# Patient Record
Sex: MALE | Race: OTHER | HISPANIC OR LATINO | URBAN - METROPOLITAN AREA
[De-identification: names, ages, dates, MRNs, and addresses within clinical notes are randomized per-mention and may not be internally consistent; named-entity substitution may affect disease eponyms.]

---

## 2018-06-13 ENCOUNTER — EMERGENCY (EMERGENCY)
Facility: HOSPITAL | Age: 48
LOS: 1 days | Discharge: HOME | End: 2018-06-13
Attending: EMERGENCY MEDICINE
Payer: MEDICARE

## 2018-06-13 VITALS
WEIGHT: 156.97 LBS | OXYGEN SATURATION: 97 % | DIASTOLIC BLOOD PRESSURE: 71 MMHG | RESPIRATION RATE: 18 BRPM | HEART RATE: 87 BPM | TEMPERATURE: 99 F | SYSTOLIC BLOOD PRESSURE: 135 MMHG | HEIGHT: 66 IN

## 2018-06-13 VITALS
OXYGEN SATURATION: 100 % | SYSTOLIC BLOOD PRESSURE: 128 MMHG | HEART RATE: 80 BPM | DIASTOLIC BLOOD PRESSURE: 68 MMHG | RESPIRATION RATE: 18 BRPM | TEMPERATURE: 98 F

## 2018-06-13 PROCEDURE — 99282 EMERGENCY DEPT VISIT SF MDM: CPT

## 2018-06-13 NOTE — ED PROVIDER NOTE - MEDICAL DECISION MAKING DETAILS
Reports dystonic reaction c/w his usual chronic dystonia reactions and states these symptoms have resolved. Noted Parkinsonian mvmts which pt states is his current baseline. Well appearing, hemodynamically stable. Discharged with need for PMD f/u and return precautions.

## 2018-06-13 NOTE — ED PROVIDER NOTE - PHYSICAL EXAMINATION
Afebrile, hemodynamically stable, saturating well  NAD, well appearing  Head NCAT  EOMI grossly, anicteric  MMM  No JVD  RRR, nml S1/S2, no m/r/g  Lungs CTAB, no w/r/r  Abd soft, NT, ND, nml BS, no rebound or guarding  AAO, CN's 3-12 grossly intact  JUNE spontaneously, no leg cyanosis or edema  Skin warm, well perfused, no rashes or hives Afebrile, hemodynamically stable, saturating well  NAD, well appearing  Head NCAT  EOMI grossly, anicteric  MM dry  RRR, nml S1/S2, no m/r/g  Lungs CTAB, no w/r/r  Abd soft, NT, ND, nml BS, no rebound or guarding  AAO, CN's 3-12 grossly intact  JUNE spontaneously, no leg cyanosis or edema  Skin warm, dry, no rashes or hives

## 2018-06-13 NOTE — ED ADULT TRIAGE NOTE - CHIEF COMPLAINT QUOTE
rachelle w/ spouse sent from clinic for pain all over . wife reports pt w/ h/o Dystonia but having " an attack " increased  muscular pains for 3 days

## 2019-02-20 ENCOUNTER — INPATIENT (INPATIENT)
Facility: HOSPITAL | Age: 49
LOS: 5 days | Discharge: NURSING, REHAB OR LONG-TERM CARE | DRG: 56 | End: 2019-02-26
Attending: INTERNAL MEDICINE | Admitting: INTERNAL MEDICINE
Payer: MEDICARE

## 2019-02-20 VITALS
OXYGEN SATURATION: 97 % | TEMPERATURE: 98 F | HEART RATE: 92 BPM | RESPIRATION RATE: 18 BRPM | WEIGHT: 160.06 LBS | SYSTOLIC BLOOD PRESSURE: 160 MMHG | DIASTOLIC BLOOD PRESSURE: 101 MMHG

## 2019-02-20 DIAGNOSIS — Z98.890 OTHER SPECIFIED POSTPROCEDURAL STATES: Chronic | ICD-10-CM

## 2019-02-20 DIAGNOSIS — Z90.49 ACQUIRED ABSENCE OF OTHER SPECIFIED PARTS OF DIGESTIVE TRACT: Chronic | ICD-10-CM

## 2019-02-20 DIAGNOSIS — R26.2 DIFFICULTY IN WALKING, NOT ELSEWHERE CLASSIFIED: ICD-10-CM

## 2019-02-20 LAB
ALBUMIN SERPL ELPH-MCNC: 4.3 G/DL — SIGNIFICANT CHANGE UP (ref 3.3–5)
ALP SERPL-CCNC: 73 U/L — SIGNIFICANT CHANGE UP (ref 40–120)
ALT FLD-CCNC: 16 U/L — SIGNIFICANT CHANGE UP (ref 10–45)
ANION GAP SERPL CALC-SCNC: 14 MMOL/L — SIGNIFICANT CHANGE UP (ref 5–17)
AST SERPL-CCNC: 24 U/L — SIGNIFICANT CHANGE UP (ref 10–40)
BASOPHILS # BLD AUTO: 0 K/UL — SIGNIFICANT CHANGE UP (ref 0–0.2)
BASOPHILS NFR BLD AUTO: 0.4 % — SIGNIFICANT CHANGE UP (ref 0–2)
BILIRUB SERPL-MCNC: 2 MG/DL — HIGH (ref 0.2–1.2)
BUN SERPL-MCNC: 11 MG/DL — SIGNIFICANT CHANGE UP (ref 7–23)
CALCIUM SERPL-MCNC: 9.4 MG/DL — SIGNIFICANT CHANGE UP (ref 8.4–10.5)
CHLORIDE SERPL-SCNC: 98 MMOL/L — SIGNIFICANT CHANGE UP (ref 96–108)
CO2 SERPL-SCNC: 25 MMOL/L — SIGNIFICANT CHANGE UP (ref 22–31)
CREAT SERPL-MCNC: 0.74 MG/DL — SIGNIFICANT CHANGE UP (ref 0.5–1.3)
D DIMER BLD IA.RAPID-MCNC: 630 NG/ML DDU — HIGH
EOSINOPHIL # BLD AUTO: 0.1 K/UL — SIGNIFICANT CHANGE UP (ref 0–0.5)
EOSINOPHIL NFR BLD AUTO: 1.2 % — SIGNIFICANT CHANGE UP (ref 0–6)
GAS PNL BLDV: SIGNIFICANT CHANGE UP
GLUCOSE SERPL-MCNC: 105 MG/DL — HIGH (ref 70–99)
HCT VFR BLD CALC: 45.6 % — SIGNIFICANT CHANGE UP (ref 39–50)
HGB BLD-MCNC: 15.2 G/DL — SIGNIFICANT CHANGE UP (ref 13–17)
LYMPHOCYTES # BLD AUTO: 1.4 K/UL — SIGNIFICANT CHANGE UP (ref 1–3.3)
LYMPHOCYTES # BLD AUTO: 17.7 % — SIGNIFICANT CHANGE UP (ref 13–44)
MAGNESIUM SERPL-MCNC: 2 MG/DL — SIGNIFICANT CHANGE UP (ref 1.6–2.6)
MCHC RBC-ENTMCNC: 30.6 PG — SIGNIFICANT CHANGE UP (ref 27–34)
MCHC RBC-ENTMCNC: 33.4 GM/DL — SIGNIFICANT CHANGE UP (ref 32–36)
MCV RBC AUTO: 91.7 FL — SIGNIFICANT CHANGE UP (ref 80–100)
MONOCYTES # BLD AUTO: 0.6 K/UL — SIGNIFICANT CHANGE UP (ref 0–0.9)
MONOCYTES NFR BLD AUTO: 8 % — SIGNIFICANT CHANGE UP (ref 2–14)
NEUTROPHILS # BLD AUTO: 5.6 K/UL — SIGNIFICANT CHANGE UP (ref 1.8–7.4)
NEUTROPHILS NFR BLD AUTO: 72.7 % — SIGNIFICANT CHANGE UP (ref 43–77)
NT-PROBNP SERPL-SCNC: 7 PG/ML — SIGNIFICANT CHANGE UP (ref 0–300)
PLATELET # BLD AUTO: 230 K/UL — SIGNIFICANT CHANGE UP (ref 150–400)
POTASSIUM SERPL-MCNC: 3.8 MMOL/L — SIGNIFICANT CHANGE UP (ref 3.5–5.3)
POTASSIUM SERPL-SCNC: 3.8 MMOL/L — SIGNIFICANT CHANGE UP (ref 3.5–5.3)
PROT SERPL-MCNC: 7.2 G/DL — SIGNIFICANT CHANGE UP (ref 6–8.3)
RBC # BLD: 4.97 M/UL — SIGNIFICANT CHANGE UP (ref 4.2–5.8)
RBC # FLD: 12.1 % — SIGNIFICANT CHANGE UP (ref 10.3–14.5)
SODIUM SERPL-SCNC: 137 MMOL/L — SIGNIFICANT CHANGE UP (ref 135–145)
TROPONIN T, HIGH SENSITIVITY RESULT: 8 NG/L — SIGNIFICANT CHANGE UP (ref 0–51)
WBC # BLD: 7.8 K/UL — SIGNIFICANT CHANGE UP (ref 3.8–10.5)
WBC # FLD AUTO: 7.8 K/UL — SIGNIFICANT CHANGE UP (ref 3.8–10.5)

## 2019-02-20 PROCEDURE — 71275 CT ANGIOGRAPHY CHEST: CPT | Mod: 26

## 2019-02-20 PROCEDURE — 73030 X-RAY EXAM OF SHOULDER: CPT | Mod: 26,50

## 2019-02-20 PROCEDURE — 71045 X-RAY EXAM CHEST 1 VIEW: CPT | Mod: 26

## 2019-02-20 PROCEDURE — 99285 EMERGENCY DEPT VISIT HI MDM: CPT | Mod: GC

## 2019-02-20 RX ORDER — CARBIDOPA AND LEVODOPA 25; 100 MG/1; MG/1
1 TABLET ORAL ONCE
Qty: 0 | Refills: 0 | Status: COMPLETED | OUTPATIENT
Start: 2019-02-20 | End: 2019-02-20

## 2019-02-20 RX ORDER — ACETAMINOPHEN 500 MG
650 TABLET ORAL ONCE
Qty: 0 | Refills: 0 | Status: DISCONTINUED | OUTPATIENT
Start: 2019-02-20 | End: 2019-02-20

## 2019-02-20 RX ORDER — ACETAMINOPHEN 500 MG
975 TABLET ORAL ONCE
Qty: 0 | Refills: 0 | Status: COMPLETED | OUTPATIENT
Start: 2019-02-20 | End: 2019-02-20

## 2019-02-20 RX ADMIN — Medication 975 MILLIGRAM(S): at 17:45

## 2019-02-20 RX ADMIN — CARBIDOPA AND LEVODOPA 1 TABLET(S): 25; 100 TABLET ORAL at 21:47

## 2019-02-20 NOTE — H&P ADULT - HISTORY OF PRESENT ILLNESS
48 year old male w/ past medical hx of Parkinson's disease and dystonia who was brought in by EMS secondary to shoulder pain. Patient initially stated he had b/l shoulder pain that started at 1PM, but then requested  services.  ID # [id number] was used. When a  was used, patient reported that he has been having mostly L shoulder pain for the last 1 month. He stated he fell down in his house side-ways secondary to gait imbalance. He did not his head, or lose consciousness during that fall. He reports 3 falls in the last year, that he attributes to gait imbalance secondary to Parkinson's disease. He requested muscle relaxers at one point. He describes his shoulder pain as dull and stabbing pain that the patient localizes to L anterior shoulder that is constant (8.5/10 in severity). He reports nothing makes the pain better or worse. He reports he tried a patch for his shoulder pain, but that did not help.    pt. is having frequent hospitalization , 4 times in last 2 months , last admission was for psych admission

## 2019-02-20 NOTE — ED PROVIDER NOTE - PROGRESS NOTE DETAILS
If all workup negative, patient may be discharged w/ neurology follow-up w/in 3-5 days. If all workup negative, patient may be discharged w/ neurology follow-up w/in 3-5 days. Dr. Nino Cavazos was called on [phone], and message left with answering service that patient's wife is requesting his medications need to adjusted. merary: pts wife came to the ER notes that cannot take care of patient at home would like him to get either more aid hours or be placed at a facility (as per conversation with resident)--will admit

## 2019-02-20 NOTE — ED PROVIDER NOTE - CLINICAL SUMMARY MEDICAL DECISION MAKING FREE TEXT BOX
48 year old male w/ parkinson's disease who was brought in by EMS for ?L shoulder pain vs CP vs SOB vs appropriate management of his Parkinsonian disorder. [Different issues/complaints are presented every time patient is questioned]. Will work up CP/SOB for ACS with trop, EKG, labs. Will also get CXR. Will consult neurology given patient and wife's concerns. 48 year old male w/ parkinson's disease who was brought in by EMS for ?L shoulder pain vs CP vs SOB vs appropriate management of his Parkinsonian disorder. [Different issues/complaints are presented every time patient is questioned]. Will work up CP/SOB for ACS with trop, EKG, labs. Will also get CXR. Will get d-dimer given immobility.

## 2019-02-20 NOTE — ED ADULT NURSE NOTE - OBJECTIVE STATEMENT
48y m pt arrived via ems; emt reports visiting nurse called ems because pt c/o shoulder pain s/p fall x 1 mo ago; nurse states pt out of pain meds; pt has hx of parkinsons and dystonia and is very rigid; ambulates with a walker; through  [facility]; #[id number] pt states has bilat shoulder pain for a month s/p multiple falls due to unsteady gait secondary to parkinsons disease; pt state left shoulder worse than right; describes pain as sharp and 48y m pt arrived via ems; emt reports visiting nurse called ems because pt c/o shoulder pain s/p fall x 1 mo ago; nurse states pt out of pain meds; pt has hx of parkinsons and dystonia and is very rigid; ambulates with a walker; through  [facility]; #[id number] pt states has bilat shoulder pain for a month s/p multiple falls due to unsteady gait secondary to parkinsons disease; pt state left shoulder worse than right; describes pain as sharp and continuous; 48y m pt arrived via ems; emt reports visiting nurse called ems because pt c/o shoulder pain s/p fall x 1 mo ago; nurse states pt out of pain meds; pt has hx of parkinsons and dystonia and is very rigid; ambulates with a walker; through  [facility]; #[id number] pt states has bilat shoulder pain for a month s/p multiple falls due to unsteady gait secondary to parkinsons disease; pt state left shoulder worse than right; describes pain as sharp and continuous; pt aox3; flat affect; no resp distress; no chest pain; no abd pain; no n/v/d; pt very rigid and has shakes; pt states ambulates only with a walker; pt states lives with wife; labs drawn pt medicated per md order; safety/comfort maintained

## 2019-02-20 NOTE — H&P ADULT - PROBLEM SELECTOR PLAN 2
will consult neurology: dr kilpatrick   -as per spouse his parkinson's symptoms are very poorly controlled

## 2019-02-20 NOTE — ED ADULT NURSE REASSESSMENT NOTE - NS ED NURSE REASSESS COMMENT FT1
Received report from ED RN Lillie; patient awake and alert- VSS, 20 g IV in R hand placed and labs drawn. patient pending xray and lab results. Spouse at the bedside.

## 2019-02-20 NOTE — H&P ADULT - NSHPLABSRESULTS_GEN_ALL_CORE
15.2   7.8   )-----------( 230      ( 20 Feb 2019 17:11 )             45.6   02-20    137  |  98  |  11  ----------------------------<  105<H>  3.8   |  25  |  0.74    Ca    9.4      20 Feb 2019 17:11  Mg     2.0     02-20    TPro  7.2  /  Alb  4.3  /  TBili  2.0<H>  /  DBili  x   /  AST  24  /  ALT  16  /  AlkPhos  73  02-20

## 2019-02-20 NOTE — ED PROVIDER NOTE - NS ED ROS FT
General:  No fever or chills.  + Fatigue (past 1 month)  HEENT: No headache. No acute vision changes. No congestion or sore throat.   Cardiovascular: No chest pain. No dyspnea on exertion.  Respiratory: +Dyspnea (acute, for the last 1 month). No orthopnea.   GI: No nausea/vomiting. No diarrhea. No abdominal pain. + constipation.   : No dysuria.   Neuro: No syncope. Hx of Parkinson's disease  Musculoskeletal: Myalgias (that patient attributes to Parkinson's disease. +Shoulder pain.

## 2019-02-20 NOTE — H&P ADULT - NSHPPHYSICALEXAM_GEN_ALL_CORE
pt. seen and examined, confused , NAD     Vital Signs Last 24 Hrs  T(C): 36.6 (20 Feb 2019 23:28), Max: 36.9 (20 Feb 2019 15:23)  T(F): 97.9 (20 Feb 2019 23:28), Max: 98.5 (20 Feb 2019 19:40)  HR: 86 (20 Feb 2019 23:28) (82 - 92)  BP: 135/84 (20 Feb 2019 23:28) (125/80 - 160/101)  BP(mean): --  RR: 18 (20 Feb 2019 23:28) (16 - 18)  SpO2: 98% (20 Feb 2019 23:28) (96% - 98%)    heent: nc/at  neck: supple, no JVD  Lungs: b/L fair A/E, no w/r/r  heart: s1s2 nml  abd: soft, NABS, NT/Nd  ext: no e/c/c, left shoulder pain , decrease ROM   neuro: aaox2, moving all ext , tremors +

## 2019-02-20 NOTE — ED PROVIDER NOTE - OBJECTIVE STATEMENT
48 year old male w/ past medical hx of Parkinson's disease and dystonia who was brought in by EMS secondary to shoulder pain. Patient initially stated he had b/l shoulder pain that started at 1PM, but then requested  services.  ID # [id number] was used. When a  was used, patient reported that he has been having mostly L shoulder pain for the last 1 month. He stated he fell down in his house side-ways secondary to gait imbalance. He did not his head, or lose consciousness during that fall. He reports 3 falls in the last year, that he attributes to gait imbalance secondary to Parkinson's disease. He requested muscle relaxers at one point. He describes his shoulder pain as dull and stabbing pain that the patient localizes to L anterior shoulder that is constant (8.5/10 in severity). He reports nothing makes the pain better or worse. He reports he tried a patch for his shoulder pain, but that did not help.     Per EMS, the visiting RN told them that the patient is out of his pain meds. 48 year old male w/ past medical hx of Parkinson's disease and dystonia who was brought in by EMS secondary to shoulder pain. Patient initially stated he had b/l shoulder pain that started at 1PM, but then requested  services.  ID # [id number] was used. When a  was used, patient reported that he has been having mostly L shoulder pain for the last 1 month. He stated he fell down in his house side-ways secondary to gait imbalance. He did not his head, or lose consciousness during that fall. He reports 3 falls in the last year, that he attributes to gait imbalance secondary to Parkinson's disease. He requested muscle relaxers at one point. He describes his shoulder pain as dull and stabbing pain that the patient localizes to L anterior shoulder that is constant (8.5/10 in severity). He reports nothing makes the pain better or worse. He reports he tried a patch for his shoulder pain, but that did not help.     Per EMS, the visiting RN told them that the patient is out of his pain meds.    Patient was later joined by his wife.  # [id number] was used to interview wife. Per wife, patient 48 year old male w/ past medical hx of Parkinson's disease and dystonia who was brought in by EMS secondary to shoulder pain. Patient initially stated he had b/l shoulder pain that started at 1PM, but then requested  services.  ID # [id number] was used. When a  was used, patient reported that he has been having mostly L shoulder pain for the last 1 month. He stated he fell down in his house side-ways secondary to gait imbalance. He did not his head, or lose consciousness during that fall. He reports 3 falls in the last year, that he attributes to gait imbalance secondary to Parkinson's disease. He requested muscle relaxers at one point. He describes his shoulder pain as dull and stabbing pain that the patient localizes to L anterior shoulder that is constant (8.5/10 in severity). He reports nothing makes the pain better or worse. He reports he tried a patch for his shoulder pain, but that did not help.     Per EMS, the visiting RN told them that the patient is out of his pain meds.    Patient was later joined by his wife.  # [id number] was used to interview wife. Per wife, patient has a home aide for 7 hrs/day. She stated she works, and that it was going to be a couple of hours that patient would be alone, and hence her home aide and she decided to send him to the ED (so that he can be watched till she's back). She, however, is concerned that his PD is not being appropriately managed. She stated he had been hospitalized four times in the last 2 months - most of them secondary to his neurological disorder, except the last one was a psychiatric hospitalization. She reports he was admitted Saturday, and was discharged yesterday. She does not believe he has a psychiatric issue, she believes the paranoia is secondary to incorrect management of his Parkinson's disease. 48 year old male w/ past medical hx of Parkinson's disease and dystonia who was brought in by EMS secondary to shoulder pain. Patient initially stated he had b/l shoulder pain that started at 1PM, but then requested  services.  ID # [id number] was used. When a  was used, patient reported that he has been having mostly L shoulder pain for the last 1 month. He stated he fell down in his house side-ways secondary to gait imbalance. He did not his head, or lose consciousness during that fall. He reports 3 falls in the last year, that he attributes to gait imbalance secondary to Parkinson's disease. He requested muscle relaxers at one point. He describes his shoulder pain as dull and stabbing pain that the patient localizes to L anterior shoulder that is constant (8.5/10 in severity). He reports nothing makes the pain better or worse. He reports he tried a patch for his shoulder pain, but that did not help.     Per EMS, the visiting RN told them that the patient is out of his pain meds.    Patient was later joined by his wife.  # [id number] was used to interview wife. Per wife, patient has a home aide for 7 hrs/day. She stated she works, and that it was going to be a couple of hours that patient would be alone, and hence her home aide and she decided to send him to the ED (so that he can be watched till she's back). She, however, is concerned that his PD is not being appropriately managed. She stated he had been hospitalized four times in the last 2 months - most of them secondary to his neurological disorder, except the last one was a psychiatric hospitalization. She reports he was admitted Saturday, and was discharged yesterday. She does not believe he has a psychiatric issue, she believes the paranoia is secondary to incorrect management of his Parkinson's disease. She reports Trihexyphenidyl was recently discontinued secondary to paranoia. Wife reports patient's neurologist recently upped his carbidopa-levadopa to "4 pills every 4 hours". They then visited a second neurologist - ?Dr. Nino Schroeder who stated that the carbidopa-levadopa dose was too high, and that patient should be seen by a neurosurgeon (?for DBS). Patient's wife believes patient's neurological condition is being mismanaged and that patient needs to be seen by a neurologist for proper management.    When asked regarding patient's shoulder pain, wife states that it is chronic and that his lidocaine patch was recently discontinued. He was instead placed on Tylenol and that it is not helping him. Wife denied patient had chest pain, but states patient has been having shortness of breath. 48 year old male w/ past medical hx of Parkinson's disease and dystonia who was brought in by EMS secondary to shoulder pain. Patient initially stated he had b/l shoulder pain that started at 1PM, but then requested  services.  ID # [id number] was used. When a  was used, patient reported that he has been having mostly L shoulder pain for the last 1 month. He stated he fell down in his house side-ways secondary to gait imbalance. He did not his head, or lose consciousness during that fall. He reports 3 falls in the last year, that he attributes to gait imbalance secondary to Parkinson's disease. He requested muscle relaxers at one point. He describes his shoulder pain as dull and stabbing pain that the patient localizes to L anterior shoulder that is constant (8.5/10 in severity). He reports nothing makes the pain better or worse. He reports he tried a patch for his shoulder pain, but that did not help.     Per EMS, the visiting RN told them that the patient is out of his pain meds.    Patient's pharmacy [facility] was called at [phone] and they provided the following list of meds:  Carbidopa-Levadopa ER  (4 tabs QID)  Methocarbamol 750 BID  Sinemet  1 tab po TID prn  Neurpro 6mg 24 hour patch.    Patient's neurologist Dr. Mckeon was called and left a message at [phone]. Awaiting call back.     Patient was later joined by his wife.     Patient's wife provided patient's medications as:  Neupro 4mg 24 hr patch  Senna 2 tabs po qHS  Amantadine 100 mg po BID  Methocarbamol 500 mg po TID  Carbidopa-Levodopa  1 tab po TID  Tylenol 325 2tabs po q6H prn      # [id number] was used to interview wife. Per wife, patient has a home aide for 7 hrs/day. She stated she works, and that it was going to be a couple of hours that patient would be alone, and hence her home aide and she decided to send him to the ED (so that he can be watched till she's back). She, however, is concerned that his PD is not being appropriately managed. She stated he had been hospitalized four times in the last 2 months - most of them secondary to his neurological disorder, except the last one was a psychiatric hospitalization. She reports he was admitted Saturday, and was discharged yesterday. She does not believe he has a psychiatric issue, she believes the paranoia is secondary to incorrect management of his Parkinson's disease. She reports Trihexyphenidyl was recently discontinued secondary to paranoia. Wife reports patient's neurologist recently upped his carbidopa-levadopa to "4 pills every 4 hours". They then visited a second neurologist - ?Dr. Nino Schroeder who stated that the carbidopa-levadopa dose was too high, and that patient should be seen by a neurosurgeon (?for DBS). Patient's wife believes patient's neurological condition is being mismanaged and that patient needs to be seen by a neurologist for proper management.    When asked regarding patient's shoulder pain, wife states that it is chronic and that his lidocaine patch was recently discontinued. He was instead placed on Tylenol and that it is not helping him. Wife denied patient had chest pain, but states patient has been having shortness of breath. 48 year old male w/ past medical hx of Parkinson's disease and dystonia who was brought in by EMS secondary to shoulder pain. Patient initially stated he had b/l shoulder pain that started at 1PM, but then requested  services.  ID # [id number] was used. When a  was used, patient reported that he has been having mostly L shoulder pain for the last 1 month. He stated he fell down in his house side-ways secondary to gait imbalance. He did not his head, or lose consciousness during that fall. He reports 3 falls in the last year, that he attributes to gait imbalance secondary to Parkinson's disease. He requested muscle relaxers at one point. He describes his shoulder pain as dull and stabbing pain that the patient localizes to L anterior shoulder that is constant (8.5/10 in severity). He reports nothing makes the pain better or worse. He reports he tried a patch for his shoulder pain, but that did not help.     Per EMS, the visiting RN told them that the patient is out of his pain meds.    Patient's pharmacy [facility] was called at [phone] and they provided the following list of meds:  Carbidopa-Levadopa ER  (4 tabs QID)  Methocarbamol 750 BID  Sinemet  1 tab po TID prn  Neurpro 6mg 24 hour patch.    Patient's neurologist Dr. Mckeon was called and left a message at [phone]. Awaiting call back.     Patient was later joined by his wife. Patient's wife provided patient's medications as:  Neupro 4mg 24 hr patch  Senna 2 tabs po qHS  Amantadine 100 mg po BID  Methocarbamol 500 mg po TID  Carbidopa-Levodopa  1 tab po TID  Tylenol 325 2tabs po q6H prn     She did confirm patient gets his medications from [facility] and that their number is [phone].      # [id number] was used to interview wife. Per wife, patient has a home aide for 7 hrs/day. She stated she works, and that it was going to be a couple of hours that patient would be alone, and hence her home aide and she decided to send him to the ED (so that he can be watched till she's back). She, however, is concerned that his PD is not being appropriately managed. She stated he had been hospitalized four times in the last 2 months - most of them secondary to his neurological disorder, except the last one was a psychiatric hospitalization. She reports he was admitted Saturday, and was discharged yesterday. She does not believe he has a psychiatric issue, she believes the paranoia is secondary to incorrect management of his Parkinson's disease. She reports Trihexyphenidyl was recently discontinued secondary to paranoia. Wife reports patient's neurologist recently upped his carbidopa-levadopa to "4 pills every 4 hours". They then visited a second neurologist - ?Dr. Nino Schroeder who stated that the carbidopa-levadopa dose was too high, and that patient should be seen by a neurosurgeon (?for DBS). Patient's wife believes patient's neurological condition is being mismanaged and that patient needs to be seen by a neurologist for proper management.    When asked regarding patient's shoulder pain, wife states that it is chronic and that his lidocaine patch was recently discontinued. He was instead placed on Tylenol and that it is not helping him. Wife denied patient had chest pain, but states patient has been having shortness of breath.

## 2019-02-20 NOTE — ED PROVIDER NOTE - ATTENDING CONTRIBUTION TO CARE
Dr. Fulton : I have personally seen and examined this patient at the bedside. I have fully participated in the care of this patient. I have reviewed all pertinent clinical information, including history, physical exam, plan and the Resident's note and agree except as noted.     47yo M hx of parkinsons dystonia poor historian pw left shoulder pain x 1 month. also notes that has had cp today 2 hr ago that lasted 45min diaphoresis nausea with it also sob. notes chronioc bl shoulder pain wears a patch for pain no new trauma but fell 1 mnth ago. ( Clarence luong)  Denies f/c/n/v/palpitations/cough/abd.pain/d/c/dysuria/hematuria. no sick contacts/recent travel.    PE:  head; atraumatic normocephalic  eyes: perrla  Heart: rrr s1s2  lungs: ctab  abd: soft, nt nd + bs no rebound/guarding no cva ttp  le: no swelling no calf ttp  ue: neurovasculalry intact mild ttp to left shoulder able to lift both arms  back: no midline cervical/thoracic/lumbar ttp      -->shoulder pain most likely chronic will fu trop ekg to ro acs; d-dimer to ro pe as does not walk as much anymore; as per wife pt needs placement--will admit; d-dimer mildly elevated will cta

## 2019-02-20 NOTE — ED PROVIDER NOTE - PHYSICAL EXAMINATION
General: Well developed, well nourished male, mildly diaphoretic.  HEENT: NCAT. Sclera anicteric.  Neuro: A&O 2-3 (knew his name,   Cardiovascular: RRR. No murmurs/rubs/gallops.  Respiratory: CTAB. No wheezes/rales/rhonci.   GI: Soft, NT, ND. No masses palpated. BS+  Extremities: No edema.  Skin: Warm, dry. No rashes noted on exposed skin.

## 2019-02-21 DIAGNOSIS — I26.99 OTHER PULMONARY EMBOLISM WITHOUT ACUTE COR PULMONALE: ICD-10-CM

## 2019-02-21 DIAGNOSIS — M25.511 PAIN IN RIGHT SHOULDER: ICD-10-CM

## 2019-02-21 DIAGNOSIS — F43.20 ADJUSTMENT DISORDER, UNSPECIFIED: ICD-10-CM

## 2019-02-21 DIAGNOSIS — R26.2 DIFFICULTY IN WALKING, NOT ELSEWHERE CLASSIFIED: ICD-10-CM

## 2019-02-21 DIAGNOSIS — F03.90 UNSPECIFIED DEMENTIA WITHOUT BEHAVIORAL DISTURBANCE: ICD-10-CM

## 2019-02-21 DIAGNOSIS — J15.9 UNSPECIFIED BACTERIAL PNEUMONIA: ICD-10-CM

## 2019-02-21 DIAGNOSIS — G20 PARKINSON'S DISEASE: ICD-10-CM

## 2019-02-21 PROCEDURE — 99222 1ST HOSP IP/OBS MODERATE 55: CPT

## 2019-02-21 RX ORDER — CARBIDOPA AND LEVODOPA 25; 100 MG/1; MG/1
1 TABLET ORAL
Qty: 0 | Refills: 0 | Status: DISCONTINUED | OUTPATIENT
Start: 2019-02-21 | End: 2019-02-21

## 2019-02-21 RX ORDER — METHOCARBAMOL 500 MG/1
750 TABLET, FILM COATED ORAL THREE TIMES A DAY
Qty: 0 | Refills: 0 | Status: DISCONTINUED | OUTPATIENT
Start: 2019-02-21 | End: 2019-02-26

## 2019-02-21 RX ORDER — CARBIDOPA AND LEVODOPA 25; 100 MG/1; MG/1
1 TABLET ORAL
Qty: 0 | Refills: 0 | Status: DISCONTINUED | OUTPATIENT
Start: 2019-02-21 | End: 2019-02-22

## 2019-02-21 RX ORDER — POLYETHYLENE GLYCOL 3350 17 G/17G
17 POWDER, FOR SOLUTION ORAL DAILY
Qty: 0 | Refills: 0 | Status: DISCONTINUED | OUTPATIENT
Start: 2019-02-21 | End: 2019-02-26

## 2019-02-21 RX ORDER — TRIHEXYPHENIDYL HCL 2 MG
0.5 TABLET ORAL
Qty: 0 | Refills: 0 | Status: DISCONTINUED | OUTPATIENT
Start: 2019-02-21 | End: 2019-02-26

## 2019-02-21 RX ORDER — QUETIAPINE FUMARATE 200 MG/1
50 TABLET, FILM COATED ORAL ONCE
Qty: 0 | Refills: 0 | Status: COMPLETED | OUTPATIENT
Start: 2019-02-21 | End: 2019-02-21

## 2019-02-21 RX ORDER — TRIHEXYPHENIDYL HCL 2 MG
0.5 TABLET ORAL
Qty: 0 | Refills: 0 | Status: DISCONTINUED | OUTPATIENT
Start: 2019-02-21 | End: 2019-02-21

## 2019-02-21 RX ORDER — CEFTRIAXONE 500 MG/1
INJECTION, POWDER, FOR SOLUTION INTRAMUSCULAR; INTRAVENOUS
Qty: 0 | Refills: 0 | Status: DISCONTINUED | OUTPATIENT
Start: 2019-02-21 | End: 2019-02-25

## 2019-02-21 RX ORDER — CARBIDOPA AND LEVODOPA 25; 100 MG/1; MG/1
1 TABLET ORAL ONCE
Qty: 0 | Refills: 0 | Status: COMPLETED | OUTPATIENT
Start: 2019-02-21 | End: 2019-02-21

## 2019-02-21 RX ORDER — AZITHROMYCIN 500 MG/1
250 TABLET, FILM COATED ORAL DAILY
Qty: 0 | Refills: 0 | Status: COMPLETED | OUTPATIENT
Start: 2019-02-22 | End: 2019-02-25

## 2019-02-21 RX ORDER — ENOXAPARIN SODIUM 100 MG/ML
40 INJECTION SUBCUTANEOUS DAILY
Qty: 0 | Refills: 0 | Status: DISCONTINUED | OUTPATIENT
Start: 2019-02-21 | End: 2019-02-21

## 2019-02-21 RX ORDER — AMANTADINE HCL 100 MG
100 CAPSULE ORAL DAILY
Qty: 0 | Refills: 0 | Status: DISCONTINUED | OUTPATIENT
Start: 2019-02-21 | End: 2019-02-26

## 2019-02-21 RX ORDER — AZITHROMYCIN 500 MG/1
500 TABLET, FILM COATED ORAL ONCE
Qty: 0 | Refills: 0 | Status: COMPLETED | OUTPATIENT
Start: 2019-02-21 | End: 2019-02-21

## 2019-02-21 RX ORDER — OXYCODONE AND ACETAMINOPHEN 5; 325 MG/1; MG/1
1 TABLET ORAL EVERY 12 HOURS
Qty: 0 | Refills: 0 | Status: DISCONTINUED | OUTPATIENT
Start: 2019-02-21 | End: 2019-02-26

## 2019-02-21 RX ORDER — IPRATROPIUM/ALBUTEROL SULFATE 18-103MCG
3 AEROSOL WITH ADAPTER (GRAM) INHALATION EVERY 6 HOURS
Qty: 0 | Refills: 0 | Status: DISCONTINUED | OUTPATIENT
Start: 2019-02-21 | End: 2019-02-26

## 2019-02-21 RX ORDER — ENOXAPARIN SODIUM 100 MG/ML
60 INJECTION SUBCUTANEOUS EVERY 12 HOURS
Qty: 0 | Refills: 0 | Status: DISCONTINUED | OUTPATIENT
Start: 2019-02-21 | End: 2019-02-23

## 2019-02-21 RX ORDER — THIAMINE MONONITRATE (VIT B1) 100 MG
500 TABLET ORAL DAILY
Qty: 0 | Refills: 0 | Status: COMPLETED | OUTPATIENT
Start: 2019-02-21 | End: 2019-02-23

## 2019-02-21 RX ORDER — CEFTRIAXONE 500 MG/1
1 INJECTION, POWDER, FOR SOLUTION INTRAMUSCULAR; INTRAVENOUS ONCE
Qty: 0 | Refills: 0 | Status: COMPLETED | OUTPATIENT
Start: 2019-02-21 | End: 2019-02-21

## 2019-02-21 RX ORDER — ROTIGOTINE 8 MG/24H
1 PATCH, EXTENDED RELEASE TRANSDERMAL EVERY 24 HOURS
Qty: 0 | Refills: 0 | Status: DISCONTINUED | OUTPATIENT
Start: 2019-02-21 | End: 2019-02-22

## 2019-02-21 RX ORDER — CEFTRIAXONE 500 MG/1
1 INJECTION, POWDER, FOR SOLUTION INTRAMUSCULAR; INTRAVENOUS EVERY 24 HOURS
Qty: 0 | Refills: 0 | Status: DISCONTINUED | OUTPATIENT
Start: 2019-02-22 | End: 2019-02-25

## 2019-02-21 RX ADMIN — Medication 105 MILLIGRAM(S): at 12:44

## 2019-02-21 RX ADMIN — QUETIAPINE FUMARATE 50 MILLIGRAM(S): 200 TABLET, FILM COATED ORAL at 00:39

## 2019-02-21 RX ADMIN — OXYCODONE AND ACETAMINOPHEN 1 TABLET(S): 5; 325 TABLET ORAL at 20:38

## 2019-02-21 RX ADMIN — AZITHROMYCIN 500 MILLIGRAM(S): 500 TABLET, FILM COATED ORAL at 05:24

## 2019-02-21 RX ADMIN — CARBIDOPA AND LEVODOPA 1 TABLET(S): 25; 100 TABLET ORAL at 20:42

## 2019-02-21 RX ADMIN — ENOXAPARIN SODIUM 60 MILLIGRAM(S): 100 INJECTION SUBCUTANEOUS at 12:44

## 2019-02-21 RX ADMIN — Medication 3 MILLILITER(S): at 20:49

## 2019-02-21 RX ADMIN — METHOCARBAMOL 750 MILLIGRAM(S): 500 TABLET, FILM COATED ORAL at 21:10

## 2019-02-21 RX ADMIN — ROTIGOTINE 1 PATCH: 8 PATCH, EXTENDED RELEASE TRANSDERMAL at 22:00

## 2019-02-21 RX ADMIN — CARBIDOPA AND LEVODOPA 1 TABLET(S): 25; 100 TABLET ORAL at 05:24

## 2019-02-21 RX ADMIN — ENOXAPARIN SODIUM 60 MILLIGRAM(S): 100 INJECTION SUBCUTANEOUS at 20:41

## 2019-02-21 RX ADMIN — CEFTRIAXONE 100 GRAM(S): 500 INJECTION, POWDER, FOR SOLUTION INTRAMUSCULAR; INTRAVENOUS at 03:46

## 2019-02-21 RX ADMIN — Medication 3 MILLILITER(S): at 11:13

## 2019-02-21 RX ADMIN — METHOCARBAMOL 750 MILLIGRAM(S): 500 TABLET, FILM COATED ORAL at 14:20

## 2019-02-21 RX ADMIN — METHOCARBAMOL 750 MILLIGRAM(S): 500 TABLET, FILM COATED ORAL at 05:24

## 2019-02-21 RX ADMIN — ROTIGOTINE 1 PATCH: 8 PATCH, EXTENDED RELEASE TRANSDERMAL at 10:16

## 2019-02-21 RX ADMIN — Medication 3 MILLILITER(S): at 05:24

## 2019-02-21 RX ADMIN — CARBIDOPA AND LEVODOPA 1 TABLET(S): 25; 100 TABLET ORAL at 02:17

## 2019-02-21 RX ADMIN — POLYETHYLENE GLYCOL 3350 17 GRAM(S): 17 POWDER, FOR SOLUTION ORAL at 11:13

## 2019-02-21 RX ADMIN — Medication 100 MILLIGRAM(S): at 06:16

## 2019-02-21 RX ADMIN — Medication 0.5 MILLIGRAM(S): at 20:38

## 2019-02-21 RX ADMIN — CARBIDOPA AND LEVODOPA 1 TABLET(S): 25; 100 TABLET ORAL at 17:03

## 2019-02-21 RX ADMIN — CARBIDOPA AND LEVODOPA 1 TABLET(S): 25; 100 TABLET ORAL at 11:13

## 2019-02-21 NOTE — BEHAVIORAL HEALTH ASSESSMENT NOTE - RISK ASSESSMENT
Risk factors: unemployed, chronic medical condition  Protective factors: no access to firearms, no suicidal ideation/intent/plan, strong support from girlfriend, domiciled  Patient is at low risk for suicidality

## 2019-02-21 NOTE — CONSULT NOTE ADULT - PROBLEM SELECTOR RECOMMENDATION 9
Incidentally he is found to have PE on ct chest: would treat him with therapeutic dosages of Lovenox and check his dopplers a s well as echocardiogram

## 2019-02-21 NOTE — BEHAVIORAL HEALTH ASSESSMENT NOTE - NSBHCHARTREVIEWIMAGING_PSY_A_CORE FT
EXAM:  CT ANGIO CHEST (W)AW IC                            PROCEDURE DATE:  02/20/2019            INTERPRETATION:  Clinical information: Shortness of breath and elevated   d-dimer levels. Evaluate for pulmonary embolus.    CT angiogram of the chest was obtained following administration of   intravenous contrast. Approximately 90 cc of Omnipaque 350 was   administered and 10 cc was discarded. Coronal, sagittal and MIP images   were submitted for review.    No hilar and/or mediastinal adenopathy is noted.     Heart is normal in size. No pericardial effusion is noted. Pulmonary   arteries are normal in caliber. Filling defects are noted within the   segmental and subsegmental branches of the left lower lobe pulmonary   artery.     No endobronchial lesions are noted. A small consolidation containing   dilated airways is noted in the apical segment of the right upper lobe.   Adjacent patchy groundglass and nodular opacities are also noted.   Calcified granuloma is noted in the lingula. No pleural effusions are   noted.    Below the diaphragm, visualized portions of the abdomen are unremarkable.     Visualized osseous structures are within normal limits.    Impression: Pulmonary emboli within the segmental and subsegmental   branches of the left lower lobe pulmonary artery.    Small consolidation containing dilated airways with adjacent patchy   groundglass/nodular opacities in the right upper lobe is of uncertain   etiology. Follow-up CT scan is recommended in 3 months to ensure   resolution.    The above finding was communicated to JEFE Summers on 2/21/2019 at 10:33 AM.

## 2019-02-21 NOTE — CONSULT NOTE ADULT - SUBJECTIVE AND OBJECTIVE BOX
Patient is a 48y old  Male who presents with a chief complaint of     HPI:  48 year old male w/ past medical hx of Parkinson's disease and dystonia who was brought in by EMS secondary to shoulder pain. Patient initially stated he had b/l shoulder pain that started at 1PM, but then requested  services.  ID # [id number] was used. When a  was used, patient reported that he has been having mostly L shoulder pain for the last 1 month. He stated he fell down in his house side-ways secondary to gait imbalance. He did not his head, or lose consciousness during that fall. He reports 3 falls in the last year, that he attributes to gait imbalance secondary to Parkinson's disease. He requested muscle relaxers at one point. He describes his shoulder pain as dull and stabbing pain that the patient localizes to L anterior shoulder that is constant (8.5/10 in severity). He reports nothing makes the pain better or worse. He reports he tried a patch for his shoulder pain, but that did not help.    pt. is having frequent hospitalization , 4 times in last 2 months , last admission was for psych admission (20 Feb 2019 23:38)    Spoke to pt with pts cousin via video phone    pt is brought here for bilateral shoulder pain: Incidental he was found to have infiltrates on ct chest and hence pulmonary called: He no cough, no phlegm an dno chest pain: He was recnetly dced from [facility] where he was treated for parkinsosn disease: He has no clinical features of pneumonia      ?FOLLOWING PRESENT  [ x] Hx of PE/DVT, [x ] Hx COPD, [ x] Hx of Asthma, [ x] Hx of Hospitalization, [ x]  Hx of BiPAP/CPAP use, [ x] Hx of BAYRON    Allergies    No Known Allergies    Intolerances        PAST MEDICAL & SURGICAL HISTORY:  Psychiatric diagnosis  Parkinson disease  S/P hernia repair  History of appendectomy      FAMILY HISTORY:  No pertinent family history in first degree relatives      Social History: [ x ] TOBACCO                  [ x ] ETOH                                 [x  ] IVDA/DRUGS    REVIEW OF SYSTEMS      bilateral shoulder pain     Skin/Breast:x  	  Ophthalmologic:x  	  ENMT:	x    Respiratory and Thorax: no cough, no chest pain, no phlegm :   	  Cardiovascular:	x    Gastrointestinal:	x    Genitourinary:	x    Musculoskeletal:	x    Neurological:	x    Psychiatric:	x    Hematology/Lymphatics:	x    Endocrine:	x    Allergic/Immunologic:x	    MEDICATIONS  (STANDING):  ALBUTerol/ipratropium for Nebulization 3 milliLiter(s) Nebulizer every 6 hours  amantadine 100 milliGRAM(s) Oral daily  carbidopa/levodopa  25/100 1 Tablet(s) Oral four times a day  cefTRIAXone   IVPB      enoxaparin Injectable 40 milliGRAM(s) SubCutaneous daily  methocarbamol 750 milliGRAM(s) Oral three times a day  oxyCODONE    5 mG/acetaminophen 325 mG 1 Tablet(s) Oral every 12 hours  polyethylene glycol 3350 17 Gram(s) Oral daily  rotigotine patch 4 mG/24 Hr(s) 1 Patch Transdermal every 24 hours  thiamine IVPB 500 milliGRAM(s) IV Intermittent daily  trihexyphenidyl Elixir 0.5 milliGRAM(s) Oral two times a day    MEDICATIONS  (PRN):       Vital Signs Last 24 Hrs  T(C): 36.5 (21 Feb 2019 07:36), Max: 36.9 (20 Feb 2019 15:23)  T(F): 97.7 (21 Feb 2019 07:36), Max: 98.5 (20 Feb 2019 19:40)  HR: 91 (21 Feb 2019 07:36) (77 - 92)  BP: 107/69 (21 Feb 2019 07:36) (107/69 - 160/101)  BP(mean): --  RR: 18 (21 Feb 2019 07:36) (16 - 18)  SpO2: 98% (21 Feb 2019 07:36) (96% - 98%)        I&O's Summary      Physical Exam:   GENERAL: NAD, well-groomed, well-developed  HEENT: CATALINA/   Atraumatic, Normocephalic  ENMT: No tonsillar erythema, exudates, or enlargement; Moist mucous membranes, Good dentition, No lesions  NECK: Supple, No JVD, Normal thyroid  CHEST/LUNG: Clear to auscultation bilaterally  CVS: Regular rate and rhythm; No murmurs, rubs, or gallops  GI: : Soft, Nontender, Nondistended; Bowel sounds present  NERVOUS SYSTEM:  Alert & Oriented X3  EXTREMITIES:  - edema  LYMPH: No lymphadenopathy noted  SKIN: No rashes or lesions  ENDOCRINOLOGY: No Thyromegaly  PSYCH: Appropriate    Labs:  Venous<43<4>>60<<7.445>>Venous<<3><<4><<5<<609>>                            15.2   7.8   )-----------( 230      ( 20 Feb 2019 17:11 )             45.6     02-20    137  |  98  |  11  ----------------------------<  105<H>  3.8   |  25  |  0.74    Ca    9.4      20 Feb 2019 17:11  Mg     2.0     02-20    TPro  7.2  /  Alb  4.3  /  TBili  2.0<H>  /  DBili  x   /  AST  24  /  ALT  16  /  AlkPhos  73  02-20    CAPILLARY BLOOD GLUCOSE        LIVER FUNCTIONS - ( 20 Feb 2019 17:11 )  Alb: 4.3 g/dL / Pro: 7.2 g/dL / ALK PHOS: 73 U/L / ALT: 16 U/L / AST: 24 U/L / GGT: x               D DImer  D-Dimer Assay, Quantitative: 630 ng/mL DDU (02-20 @ 19:43)  Serum Pro-Brain Natriuretic Peptide: 7 pg/mL (02-20 @ 17:11)      Studies  Chest X-RAY  CT SCAN Chest   CT Abdomen  Venous Dopplers: LE:   Others    < from: CT Angio Chest w/ IV Cont (02.20.19 @ 21:42) >    No hilar and/or mediastinal adenopathy is noted.     Heart is normal in size. No pericardial effusion is noted. Pulmonary   arteries are normal in caliber. Filling defects are noted within the   segmental and subsegmental branches of the left lower lobe pulmonary   artery.     No endobronchial lesions are noted. A small consolidation containing   dilated airways is noted in the apical segment of the right upper lobe.   Adjacent patchy groundglass and nodular opacities are also noted.   Calcified granuloma is noted in the lingula. No pleural effusions are   noted.    Below the diaphragm, visualized portions of the abdomen are unremarkable.     Visualized osseous structures are within normal limits.    Impression: Pulmonary emboli within the segmental and subsegmental   branches of the left lower lobe pulmonary artery.    Small consolidation containing dilated airways with adjacent patchy   groundglass/nodular opacities in the right upper lobe is of uncertain   etiology. Follow-up CT scan is recommended in 3 months to ensure   resolution.    The above finding was communicated to JEFE Summers on 2/21/2019 at 10:33 AM.                    JEAN CARLOS ARGUELLO M.D., ATTENDING RADIOLOGIST  This document has been electronically signed. Feb 21 2019 10:35AM        < end of copied text >
[facility]        Patient is a 48y old  Male who presents with a chief complaint of     HPI:  48 year old male w/ past medical hx of Parkinson's disease and dystonia who was brought in by EMS secondary to shoulder pain. Patient initially stated he had b/l shoulder pain that started at 1PM, but then requested  services.  ID # [id number] was used. When a  was used, patient reported that he has been having mostly L shoulder pain for the last 1 month. He stated he fell down in his house side-ways secondary to gait imbalance. He did not his head, or lose consciousness during that fall. He reports 3 falls in the last year, that he attributes to gait imbalance secondary to Parkinson's disease. He requested muscle relaxers at one point. He describes his shoulder pain as dull and stabbing pain that the patient localizes to L anterior shoulder that is constant (8.5/10 in severity). He reports nothing makes the pain better or worse. He reports he tried a patch for his shoulder pain, but that did not help.    pt. is having frequent hospitalization , 4 times in last 2 months , last admission was for psych admission   According to his spouse,  Pt has had parkinsons for 8 years, followed by [facility],   he switched to Erica Mckeon who is a Ethiopian speaking neurologist after which medications were changed which made him very paranoid with visual hallucinations and psychosis.   He was admitted to [facility] for a prolonged period where his meds were lowered and he was tried on trihexyphenydyl.   he was referred for deep brain stimulator with Dr. Dye he has an appt in march   His spouse does not want him taking the trihexyphenydyl which according to her causes hallucinations.     ON further questioning the only reason she brought him in was after discharge from [place] re[facility] pt was alone for 4 hours, as his spouse has to work and she was concerned.          *****PAST MEDICAL / Surgical  HISTORY:  PAST MEDICAL & SURGICAL HISTORY:  Psychiatric diagnosis  Parkinson disease  S/P hernia repair  History of appendectomy           *****FAMILY HISTORY:  FAMILY HISTORY:  No pertinent family history in first degree relatives           *****SOCIAL HISTORY:  Alcohol: None  Smoking: None  worked in construction stopped working 4 years ago.        *****ALLERGIES:   Allergies    No Known Allergies    Intolerances             *****MEDICATIONS: current medication reviewed and documented.   MEDICATIONS  (STANDING):  ALBUTerol/ipratropium for Nebulization 3 milliLiter(s) Nebulizer every 6 hours  amantadine 100 milliGRAM(s) Oral daily  carbidopa/levodopa  25/250 1 Tablet(s) Oral four times a day  cefTRIAXone   IVPB      enoxaparin Injectable 40 milliGRAM(s) SubCutaneous daily  methocarbamol 750 milliGRAM(s) Oral three times a day  oxyCODONE    5 mG/acetaminophen 325 mG 1 Tablet(s) Oral every 12 hours  polyethylene glycol 3350 17 Gram(s) Oral daily  rotigotine patch 4 mG/24 Hr(s) 1 Patch Transdermal every 24 hours    MEDICATIONS  (PRN):           *****REVIEW OF SYSTEM:  GEN: no fever, no chills, no pain  RESP: no SOB, no cough, no sputum  CVS: no chest pain, no palpitations, no edema  GI: no abdominal pain, no nausea, no vomiting, no constipation, no diarrhea  : no dysurea, no frequency, no hematurea  Neuro: no headache, no dizziness  PSYCH: no anxiety, no depression  Derm : no itching, no rash         *****VITAL SIGNS:  T(C): 36.5 (02-21-19 @ 07:36), Max: 36.9 (02-20-19 @ 15:23)  HR: 91 (02-21-19 @ 07:36) (77 - 92)  BP: 107/69 (02-21-19 @ 07:36) (107/69 - 160/101)  RR: 18 (02-21-19 @ 07:36) (16 - 18)  SpO2: 98% (02-21-19 @ 07:36) (96% - 98%)  Wt(kg): --           *****PHYSICAL EXAM:   Alert oriented x 2  Attention comprehension are fair. Able to name, repeat, read without any difficulty.   Able to follow 1-2  step commands.  hypophonic      EOMI fundi not visualized,  VFF to confrontration  No facial asymmetry   Tongue is midline   Palate elevates symmetrically   Moving both upper ext spontaneously   increased tone throughout    not moving b/l lower ext to command does withdraw with pain..   Gait : not assessed.                 *****LAB AND IMAGING:                          15.2   7.8   )-----------( 230      ( 20 Feb 2019 17:11 )             45.6               02-20    137  |  98  |  11  ----------------------------<  105<H>  3.8   |  25  |  0.74    Ca    9.4      20 Feb 2019 17:11  Mg     2.0     02-20    TPro  7.2  /  Alb  4.3  /  TBili  2.0<H>  /  DBili  x   /  AST  24  /  ALT  16  /  AlkPhos  73  02-20                                [All pertinent recent Imaging reports reviewed]         *****A S S E S S M E N T   A N D   P L A N :     48 year old male w/ past medical hx of Parkinson's disease and dystonia who was brought in by EMS secondary to shoulder pain. Patient initially stated he had b/l shoulder pain that started at 1PM, but then requested  services.  ID # [id number] was used. When a  was used, patient reported that he has been having mostly L shoulder pain for the last 1 month. He stated he fell down in his house side-ways secondary to gait imbalance. He did not his head, or lose consciousness during that fall. He reports 3 falls in the last year, that he attributes to gait imbalance secondary to Parkinson's disease. He requested muscle relaxers at one point. He describes his shoulder pain as dull and stabbing pain that the patient localizes to L anterior shoulder that is constant (8.5/10 in severity). He reports nothing makes the pain better or worse. He reports he tried a patch for his shoulder pain, but that did not help.          Problem/Recommendations 1:Parkinson's disease   currently on sinemet 25/250 four times a day, methocarbamol, amantadine and neupro patch.  She showed me medication list from [facility] which states he was taking only 25/100 of sinemet there.  Will change to 25/100.   unclear how long pt has been on neupro patch may be contributing to the  psychosis.   Please get psych consult   would get cpk, aldolase.    Wife is requesting transfer to [facility] as all his doctors are there.     Problem/Recommendations 2: Dystonia unclear etiology.   as per wife, he had extensive w/u and testing at [facility] recently.   pt was previously taking artane which was stopped abruptly per wife. Would start artane 0.5 bid   please obtain records from [facility].   being considered for dbs by Dr. Dye        ___________________________  Will follow with you.  Thank you,  Elham Shah MD  Diplomate of the American Board of Neurology and Psychiatry.  Diplomate of the American Board of Vascular Neurology.   [facility]   Ph: [phone]    Differential diagnosis and plan of care discussed with patient after the evaluation.   Advanced care planning options discussed.   Pain assessed and judicious use of narcotics when appropriate was discussed.  Importance of Fall prevention discussed.  Counseling on Smoking and Alcohol cessation was offered when appropriate.  Counseling on Diet, exercise, and medication compliance was done.     62 minutes spent on the total encounter;  more than 50 % of the visit was spent on counseling  and or coordinating care by the attending physician.    Thank you for allowing me to participate in the care of this mendoza patient. Please do not hesitate to call me if you have any questions.     This and subsequent notes were partially created using voice recognition software and will  inherently be subject to errors including those of syntax and sound alike substitutions which may escape proofreading. In such instances original meaning may be extrapolated by contextual derivation.

## 2019-02-21 NOTE — CONSULT NOTE ADULT - PROBLEM SELECTOR RECOMMENDATION 2
Doubt pneumonia: He has some subtle infiltrate sin the right upper lobe ? aspirated: he has been afebrile as well as with normal WBC count: Will shorten the duration of antibiotics if his cultures remains negative in addition to legionella!

## 2019-02-21 NOTE — BEHAVIORAL HEALTH ASSESSMENT NOTE - NSBHCHARTREVIEWVS_PSY_A_CORE FT
Vital Signs Last 24 Hrs  T(C): 36.9 (21 Feb 2019 15:51), Max: 36.9 (20 Feb 2019 17:47)  T(F): 98.4 (21 Feb 2019 15:51), Max: 98.5 (20 Feb 2019 19:40)  HR: 82 (21 Feb 2019 15:51) (77 - 91)  BP: 112/71 (21 Feb 2019 15:51) (107/69 - 135/84)  BP(mean): --  RR: 18 (21 Feb 2019 15:51) (16 - 18)  SpO2: 96% (21 Feb 2019 15:51) (96% - 98%)

## 2019-02-21 NOTE — CONSULT NOTE ADULT - ASSESSMENT
48 year old male w/ past medical hx of Parkinson's disease and dystonia who was brought in by EMS secondary to shoulder pain. Patient initially stated he had b/l shoulder pain that started at 1PM, but then requested  services.  ID # [id number] was used. When a  was used, patient reported that he has been having mostly L shoulder pain for the last 1 month. He stated he fell down in his house side-ways secondary to gait imbalance. He did not his head, or lose consciousness during that fall. He reports 3 falls in the last year, that he attributes to gait imbalance secondary to Parkinson's disease. He requested muscle relaxers at one point. He describes his shoulder pain as dull and stabbing pain that the patient localizes to L anterior shoulder that is constant (8.5/10 in severity). He reports nothing makes the pain better or worse. He reports he tried a patch for his shoulder pain, but that did not help.    pt. is having frequent hospitalization , 4 times in last 2 months , last admission was for psych admission (20 Feb 2019 23:38)    Spoke to pt with pts cousin via video phone    pt is brought here for bilateral shoulder pain: Incidental he was found to have infiltrates on ct chest and hence pulmonary called: He no cough, no phlegm an dno chest pain: He was recnetly dced from [facility] where he was treated for parkinsosn disease: He has no clinical features of pneumonia

## 2019-02-21 NOTE — BEHAVIORAL HEALTH ASSESSMENT NOTE - NSBHCHARTREVIEWINVESTIGATE_PSY_A_CORE FT
< from: 12 Lead ECG (02.20.19 @ 17:08) >    Ventricular Rate 78 BPM    Atrial Rate 78 BPM    P-R Interval 138 ms    QRS Duration 86 ms    Q-T Interval 388 ms    QTC Calculation(Bezet) 442 ms    P Axis 68 degrees    R Axis 49 degrees    T Axis 56 degrees    Diagnosis Line NORMAL SINUS RHYTHM  SEPTAL INFARCT , AGE UNDETERMINED  ABNORMAL ECG    Confirmed by ATTENDING, ED ([id number]),  BRIGIDA NEWBERRY ([id number]) on 2/21/2019 7:14:16 AM    < end of copied text >

## 2019-02-21 NOTE — BEHAVIORAL HEALTH ASSESSMENT NOTE - SUMMARY
48 year old unemployed Faroese-speaking  man living alone,  from his wife for 3 years and has a current girlfriend, with no formal psychiatric history and a PMHx of Parkinsons for 8 years presenting to the ED with shoulder pain and admitted for UTI. The patient has outside nursing care through the [facility] agency; the nurse expressed concerns that the patient has been intentionally overdosing on his medications. Psychiatry was consulted for possible suicidality and intentional overdosing. Patient denied intentional overdosing on medications, instead stating that he cut down on his carbidopa/levodopa due to the side effects of hallucinations and paranoia. He denies any AH/VH, SI/HI currently and denies access to firearms.

## 2019-02-21 NOTE — BEHAVIORAL HEALTH ASSESSMENT NOTE - HPI (INCLUDE ILLNESS QUALITY, SEVERITY, DURATION, TIMING, CONTEXT, MODIFYING FACTORS, ASSOCIATED SIGNS AND SYMPTOMS)
48 year old Kyrgyz-speaking  man living alone,  from his wife for 3 years and has a current girlfriend, currently disabled, with no formal psychiatric history and a PMHx of Parkinsons for 8 years presenting to the ED with shoulder pain and admitted for UTI. The patient has outside nursing care through the [facility] agency; the nurse expressed concerns that the patient has been intentionally overdosing on his medications. Psychiatry was consulted for possible suicidality and intentional overdosing.     The patient was seen at bedside, and his current girlfriend was at bedside with him. Pt denied taking extra medications at home, was taking the sinemet as prescribed by his neurologist Dr. Thompson. Pt reported feeling depressed intermittently in context of medical issues, loss of independence. Pt denied si/hi. Pt reports fair sleep, appetite. Pt denies psychosis currently, merlin, anxiety. Pt reports falling in his home a lot, mostly at night. Collateral obtained from girlfriend, states the patient had Parkinsons for 8 years, and has been taking carbidopa/levodopa. While on the carbidopa/levodopa, he experienced an episode of psychosis, including hallucinations and paranoia, and attributed it to an overly high dose of the medication. Pt was prescribed 4 pills of sinemet 4 times a day few weeks ago, and pt became paranoid for 2 weeks. When Sinemet was lowered then pt's psychosis resolved. Per the girlfriend, he has not been sleeping well, sleeping at most 3 hours a night. Pt denies access to firearms at home. no agitation at home. Both he and the girlfriend deny any family history of psychiatric illness, but note that his paternal grandmother had Alzheimer's. pt has been on seroquel 100mg po qhs for insomnia, was given by a neurologist. Pt has never been seen by a psychiatrist before.

## 2019-02-22 DIAGNOSIS — A15.9 RESPIRATORY TUBERCULOSIS UNSPECIFIED: ICD-10-CM

## 2019-02-22 LAB
ALBUMIN SERPL ELPH-MCNC: 4.1 G/DL — SIGNIFICANT CHANGE UP (ref 3.3–5)
ALP SERPL-CCNC: 63 U/L — SIGNIFICANT CHANGE UP (ref 40–120)
ALT FLD-CCNC: <5 U/L — LOW (ref 10–45)
ANION GAP SERPL CALC-SCNC: 12 MMOL/L — SIGNIFICANT CHANGE UP (ref 5–17)
AST SERPL-CCNC: 11 U/L — SIGNIFICANT CHANGE UP (ref 10–40)
BASOPHILS # BLD AUTO: 0.02 K/UL — SIGNIFICANT CHANGE UP (ref 0–0.2)
BASOPHILS NFR BLD AUTO: 0.3 % — SIGNIFICANT CHANGE UP (ref 0–2)
BILIRUB SERPL-MCNC: 1.2 MG/DL — SIGNIFICANT CHANGE UP (ref 0.2–1.2)
BUN SERPL-MCNC: 10 MG/DL — SIGNIFICANT CHANGE UP (ref 7–23)
CALCIUM SERPL-MCNC: 9.4 MG/DL — SIGNIFICANT CHANGE UP (ref 8.4–10.5)
CHLORIDE SERPL-SCNC: 100 MMOL/L — SIGNIFICANT CHANGE UP (ref 96–108)
CK SERPL-CCNC: 98 U/L — SIGNIFICANT CHANGE UP (ref 30–200)
CO2 SERPL-SCNC: 26 MMOL/L — SIGNIFICANT CHANGE UP (ref 22–31)
CREAT SERPL-MCNC: 0.76 MG/DL — SIGNIFICANT CHANGE UP (ref 0.5–1.3)
EOSINOPHIL # BLD AUTO: 0.13 K/UL — SIGNIFICANT CHANGE UP (ref 0–0.5)
EOSINOPHIL NFR BLD AUTO: 2.1 % — SIGNIFICANT CHANGE UP (ref 0–6)
FOLATE SERPL-MCNC: 6.7 NG/ML — SIGNIFICANT CHANGE UP
GLUCOSE SERPL-MCNC: 95 MG/DL — SIGNIFICANT CHANGE UP (ref 70–99)
HCT VFR BLD CALC: 44.1 % — SIGNIFICANT CHANGE UP (ref 39–50)
HGB BLD-MCNC: 14.2 G/DL — SIGNIFICANT CHANGE UP (ref 13–17)
IMM GRANULOCYTES NFR BLD AUTO: 0.2 % — SIGNIFICANT CHANGE UP (ref 0–1.5)
LYMPHOCYTES # BLD AUTO: 2.02 K/UL — SIGNIFICANT CHANGE UP (ref 1–3.3)
LYMPHOCYTES # BLD AUTO: 33.2 % — SIGNIFICANT CHANGE UP (ref 13–44)
MCHC RBC-ENTMCNC: 30.9 PG — SIGNIFICANT CHANGE UP (ref 27–34)
MCHC RBC-ENTMCNC: 32.2 GM/DL — SIGNIFICANT CHANGE UP (ref 32–36)
MCV RBC AUTO: 96.1 FL — SIGNIFICANT CHANGE UP (ref 80–100)
MONOCYTES # BLD AUTO: 0.51 K/UL — SIGNIFICANT CHANGE UP (ref 0–0.9)
MONOCYTES NFR BLD AUTO: 8.4 % — SIGNIFICANT CHANGE UP (ref 2–14)
NEUTROPHILS # BLD AUTO: 3.4 K/UL — SIGNIFICANT CHANGE UP (ref 1.8–7.4)
NEUTROPHILS NFR BLD AUTO: 55.8 % — SIGNIFICANT CHANGE UP (ref 43–77)
PLATELET # BLD AUTO: 205 K/UL — SIGNIFICANT CHANGE UP (ref 150–400)
POTASSIUM SERPL-MCNC: 3.7 MMOL/L — SIGNIFICANT CHANGE UP (ref 3.5–5.3)
POTASSIUM SERPL-SCNC: 3.7 MMOL/L — SIGNIFICANT CHANGE UP (ref 3.5–5.3)
PROT SERPL-MCNC: 6.5 G/DL — SIGNIFICANT CHANGE UP (ref 6–8.3)
RBC # BLD: 4.59 M/UL — SIGNIFICANT CHANGE UP (ref 4.2–5.8)
RBC # FLD: 12.7 % — SIGNIFICANT CHANGE UP (ref 10.3–14.5)
SODIUM SERPL-SCNC: 138 MMOL/L — SIGNIFICANT CHANGE UP (ref 135–145)
T PALLIDUM AB TITR SER: NEGATIVE — SIGNIFICANT CHANGE UP
TSH SERPL-MCNC: 1.5 UIU/ML — SIGNIFICANT CHANGE UP (ref 0.27–4.2)
VIT B12 SERPL-MCNC: 318 PG/ML — SIGNIFICANT CHANGE UP (ref 232–1245)
WBC # BLD: 6.09 K/UL — SIGNIFICANT CHANGE UP (ref 3.8–10.5)
WBC # FLD AUTO: 6.09 K/UL — SIGNIFICANT CHANGE UP (ref 3.8–10.5)

## 2019-02-22 PROCEDURE — 72040 X-RAY EXAM NECK SPINE 2-3 VW: CPT | Mod: 26

## 2019-02-22 RX ORDER — CARBIDOPA AND LEVODOPA 25; 100 MG/1; MG/1
1 TABLET ORAL ONCE
Qty: 0 | Refills: 0 | Status: COMPLETED | OUTPATIENT
Start: 2019-02-22 | End: 2019-02-22

## 2019-02-22 RX ORDER — ROTIGOTINE 8 MG/24H
1 PATCH, EXTENDED RELEASE TRANSDERMAL EVERY 24 HOURS
Qty: 0 | Refills: 0 | Status: DISCONTINUED | OUTPATIENT
Start: 2019-02-22 | End: 2019-02-25

## 2019-02-22 RX ORDER — CARBIDOPA AND LEVODOPA 25; 100 MG/1; MG/1
1 TABLET ORAL
Qty: 0 | Refills: 0 | Status: DISCONTINUED | OUTPATIENT
Start: 2019-02-22 | End: 2019-02-26

## 2019-02-22 RX ORDER — OXYCODONE HYDROCHLORIDE 5 MG/1
2.5 TABLET ORAL ONCE
Qty: 0 | Refills: 0 | Status: DISCONTINUED | OUTPATIENT
Start: 2019-02-22 | End: 2019-02-23

## 2019-02-22 RX ADMIN — ROTIGOTINE 1 PATCH: 8 PATCH, EXTENDED RELEASE TRANSDERMAL at 18:56

## 2019-02-22 RX ADMIN — CARBIDOPA AND LEVODOPA 1 TABLET(S): 25; 100 TABLET ORAL at 04:08

## 2019-02-22 RX ADMIN — CARBIDOPA AND LEVODOPA 1 TABLET(S): 25; 100 TABLET ORAL at 18:50

## 2019-02-22 RX ADMIN — AZITHROMYCIN 250 MILLIGRAM(S): 500 TABLET, FILM COATED ORAL at 12:24

## 2019-02-22 RX ADMIN — Medication 3 MILLILITER(S): at 12:29

## 2019-02-22 RX ADMIN — ROTIGOTINE 1 PATCH: 8 PATCH, EXTENDED RELEASE TRANSDERMAL at 07:00

## 2019-02-22 RX ADMIN — OXYCODONE AND ACETAMINOPHEN 1 TABLET(S): 5; 325 TABLET ORAL at 18:55

## 2019-02-22 RX ADMIN — METHOCARBAMOL 750 MILLIGRAM(S): 500 TABLET, FILM COATED ORAL at 06:26

## 2019-02-22 RX ADMIN — ROTIGOTINE 1 PATCH: 8 PATCH, EXTENDED RELEASE TRANSDERMAL at 10:00

## 2019-02-22 RX ADMIN — Medication 105 MILLIGRAM(S): at 12:23

## 2019-02-22 RX ADMIN — CEFTRIAXONE 100 GRAM(S): 500 INJECTION, POWDER, FOR SOLUTION INTRAMUSCULAR; INTRAVENOUS at 06:26

## 2019-02-22 RX ADMIN — CARBIDOPA AND LEVODOPA 1 TABLET(S): 25; 100 TABLET ORAL at 08:08

## 2019-02-22 RX ADMIN — ROTIGOTINE 1 PATCH: 8 PATCH, EXTENDED RELEASE TRANSDERMAL at 10:24

## 2019-02-22 RX ADMIN — ENOXAPARIN SODIUM 60 MILLIGRAM(S): 100 INJECTION SUBCUTANEOUS at 18:54

## 2019-02-22 RX ADMIN — METHOCARBAMOL 750 MILLIGRAM(S): 500 TABLET, FILM COATED ORAL at 20:28

## 2019-02-22 RX ADMIN — Medication 3 MILLILITER(S): at 18:53

## 2019-02-22 RX ADMIN — Medication 3 MILLILITER(S): at 06:26

## 2019-02-22 RX ADMIN — CARBIDOPA AND LEVODOPA 1 TABLET(S): 25; 100 TABLET ORAL at 20:28

## 2019-02-22 RX ADMIN — METHOCARBAMOL 750 MILLIGRAM(S): 500 TABLET, FILM COATED ORAL at 13:33

## 2019-02-22 RX ADMIN — ROTIGOTINE 1 PATCH: 8 PATCH, EXTENDED RELEASE TRANSDERMAL at 18:55

## 2019-02-22 RX ADMIN — Medication 0.5 MILLIGRAM(S): at 06:26

## 2019-02-22 RX ADMIN — OXYCODONE AND ACETAMINOPHEN 1 TABLET(S): 5; 325 TABLET ORAL at 18:54

## 2019-02-22 RX ADMIN — POLYETHYLENE GLYCOL 3350 17 GRAM(S): 17 POWDER, FOR SOLUTION ORAL at 12:26

## 2019-02-22 RX ADMIN — OXYCODONE AND ACETAMINOPHEN 1 TABLET(S): 5; 325 TABLET ORAL at 06:27

## 2019-02-22 RX ADMIN — Medication 0.5 MILLIGRAM(S): at 18:54

## 2019-02-22 RX ADMIN — ROTIGOTINE 1 PATCH: 8 PATCH, EXTENDED RELEASE TRANSDERMAL at 18:53

## 2019-02-22 RX ADMIN — Medication 100 MILLIGRAM(S): at 12:24

## 2019-02-22 RX ADMIN — ENOXAPARIN SODIUM 60 MILLIGRAM(S): 100 INJECTION SUBCUTANEOUS at 06:27

## 2019-02-22 RX ADMIN — CARBIDOPA AND LEVODOPA 1 TABLET(S): 25; 100 TABLET ORAL at 13:33

## 2019-02-22 NOTE — PHYSICAL THERAPY INITIAL EVALUATION ADULT - PERTINENT HX OF CURRENT PROBLEM, REHAB EVAL
47 y/o male admitted on 2/20/19  w/ PMH of Parkinson's disease and dystonia who was brought in by EMS secondary to L shoulder pain. Pt initially stated he had b/l shoulder pain that started at 1PM, but then requested  services.  ID # [id number] was used. When a  was used, patient reported that he has been having mostly L shoulder pain for the last

## 2019-02-22 NOTE — PHYSICAL THERAPY INITIAL EVALUATION ADULT - ADDITIONAL COMMENTS
lives w/ wife in 1 month. He stated he fell down in his house side-ways secondary to gait imbalance. He did not his head, or LOC  during that fall. He reports 3 falls in the last year, that he attributes to gait imbalance secondary to Parkinson's disease.  lives w/ wife in 1 month. He stated he fell down in his house side-ways secondary to gait imbalance. He did not his head, or LOC  during that fall. He reports 3 falls in the last year, that he attributes to gait imbalance secondary to Parkinson's disease. B/L sh xrays (-) for fx, CT angio (+ ) PE  lives w/ wife in 1 month. He stated he fell down in his house side-ways secondary to gait imbalance. He did not his head, or LOC  during that fall. He reports 3 falls in the last year, that he attributes to gait imbalance secondary to Parkinson's disease. B/L sh xrays (-) for fx, CT angio (+ ) PE  lives alone in apt, no steps /using rolling walker / had 7 days 7 hours HHA, hearing good, R handed, reading glasses

## 2019-02-22 NOTE — PHYSICAL THERAPY INITIAL EVALUATION ADULT - ACTIVE RANGE OF MOTION EXAMINATION, REHAB EVAL
L sh AAROM to 90/ 2/2 to pain //bilateral upper extremity Active ROM was WFL (within functional limits)/bilateral  lower extremity Active ROM was WFL (within functional limits)

## 2019-02-22 NOTE — PHYSICAL THERAPY INITIAL EVALUATION ADULT - DIAGNOSIS, PT EVAL
pain B sh  L > R / L side resting tremor H/o PD, decreased mm strength L side ext > R ext, decreased all functional mobility

## 2019-02-23 LAB
ALBUMIN SERPL ELPH-MCNC: 4 G/DL — SIGNIFICANT CHANGE UP (ref 3.3–5)
ALP SERPL-CCNC: 63 U/L — SIGNIFICANT CHANGE UP (ref 40–120)
ALT FLD-CCNC: <5 U/L — LOW (ref 10–45)
ANION GAP SERPL CALC-SCNC: 13 MMOL/L — SIGNIFICANT CHANGE UP (ref 5–17)
AST SERPL-CCNC: 9 U/L — LOW (ref 10–40)
BASOPHILS # BLD AUTO: 0.04 K/UL — SIGNIFICANT CHANGE UP (ref 0–0.2)
BASOPHILS NFR BLD AUTO: 0.7 % — SIGNIFICANT CHANGE UP (ref 0–2)
BILIRUB SERPL-MCNC: 1.4 MG/DL — HIGH (ref 0.2–1.2)
BUN SERPL-MCNC: 10 MG/DL — SIGNIFICANT CHANGE UP (ref 7–23)
CALCIUM SERPL-MCNC: 9.5 MG/DL — SIGNIFICANT CHANGE UP (ref 8.4–10.5)
CHLORIDE SERPL-SCNC: 99 MMOL/L — SIGNIFICANT CHANGE UP (ref 96–108)
CO2 SERPL-SCNC: 28 MMOL/L — SIGNIFICANT CHANGE UP (ref 22–31)
CREAT SERPL-MCNC: 0.83 MG/DL — SIGNIFICANT CHANGE UP (ref 0.5–1.3)
EOSINOPHIL # BLD AUTO: 0.11 K/UL — SIGNIFICANT CHANGE UP (ref 0–0.5)
EOSINOPHIL NFR BLD AUTO: 2 % — SIGNIFICANT CHANGE UP (ref 0–6)
GLUCOSE SERPL-MCNC: 87 MG/DL — SIGNIFICANT CHANGE UP (ref 70–99)
HCT VFR BLD CALC: 42.1 % — SIGNIFICANT CHANGE UP (ref 39–50)
HGB BLD-MCNC: 14.2 G/DL — SIGNIFICANT CHANGE UP (ref 13–17)
IMM GRANULOCYTES NFR BLD AUTO: 0.2 % — SIGNIFICANT CHANGE UP (ref 0–1.5)
INR BLD: 1.06 RATIO — SIGNIFICANT CHANGE UP (ref 0.88–1.16)
LYMPHOCYTES # BLD AUTO: 2.18 K/UL — SIGNIFICANT CHANGE UP (ref 1–3.3)
LYMPHOCYTES # BLD AUTO: 38.9 % — SIGNIFICANT CHANGE UP (ref 13–44)
MCHC RBC-ENTMCNC: 31.9 PG — SIGNIFICANT CHANGE UP (ref 27–34)
MCHC RBC-ENTMCNC: 33.7 GM/DL — SIGNIFICANT CHANGE UP (ref 32–36)
MCV RBC AUTO: 94.6 FL — SIGNIFICANT CHANGE UP (ref 80–100)
MONOCYTES # BLD AUTO: 0.49 K/UL — SIGNIFICANT CHANGE UP (ref 0–0.9)
MONOCYTES NFR BLD AUTO: 8.8 % — SIGNIFICANT CHANGE UP (ref 2–14)
NEUTROPHILS # BLD AUTO: 2.77 K/UL — SIGNIFICANT CHANGE UP (ref 1.8–7.4)
NEUTROPHILS NFR BLD AUTO: 49.4 % — SIGNIFICANT CHANGE UP (ref 43–77)
PLATELET # BLD AUTO: 216 K/UL — SIGNIFICANT CHANGE UP (ref 150–400)
POTASSIUM SERPL-MCNC: 4.1 MMOL/L — SIGNIFICANT CHANGE UP (ref 3.5–5.3)
POTASSIUM SERPL-SCNC: 4.1 MMOL/L — SIGNIFICANT CHANGE UP (ref 3.5–5.3)
PROT SERPL-MCNC: 6.5 G/DL — SIGNIFICANT CHANGE UP (ref 6–8.3)
PROTHROM AB SERPL-ACNC: 12.1 SEC — SIGNIFICANT CHANGE UP (ref 10–13.1)
RBC # BLD: 4.45 M/UL — SIGNIFICANT CHANGE UP (ref 4.2–5.8)
RBC # FLD: 12.5 % — SIGNIFICANT CHANGE UP (ref 10.3–14.5)
SODIUM SERPL-SCNC: 140 MMOL/L — SIGNIFICANT CHANGE UP (ref 135–145)
WBC # BLD: 5.6 K/UL — SIGNIFICANT CHANGE UP (ref 3.8–10.5)
WBC # FLD AUTO: 5.6 K/UL — SIGNIFICANT CHANGE UP (ref 3.8–10.5)

## 2019-02-23 RX ORDER — ACETAMINOPHEN 500 MG
650 TABLET ORAL ONCE
Qty: 0 | Refills: 0 | Status: COMPLETED | OUTPATIENT
Start: 2019-02-23 | End: 2019-02-23

## 2019-02-23 RX ORDER — RIVAROXABAN 15 MG-20MG
15 KIT ORAL
Qty: 0 | Refills: 0 | Status: DISCONTINUED | OUTPATIENT
Start: 2019-02-24 | End: 2019-02-26

## 2019-02-23 RX ORDER — PREGABALIN 225 MG/1
1000 CAPSULE ORAL DAILY
Qty: 0 | Refills: 0 | Status: DISCONTINUED | OUTPATIENT
Start: 2019-02-23 | End: 2019-02-25

## 2019-02-23 RX ORDER — OXYCODONE AND ACETAMINOPHEN 5; 325 MG/1; MG/1
1 TABLET ORAL ONCE
Qty: 0 | Refills: 0 | Status: DISCONTINUED | OUTPATIENT
Start: 2019-02-23 | End: 2019-02-23

## 2019-02-23 RX ADMIN — CARBIDOPA AND LEVODOPA 1 TABLET(S): 25; 100 TABLET ORAL at 17:40

## 2019-02-23 RX ADMIN — ENOXAPARIN SODIUM 60 MILLIGRAM(S): 100 INJECTION SUBCUTANEOUS at 05:21

## 2019-02-23 RX ADMIN — Medication 105 MILLIGRAM(S): at 12:57

## 2019-02-23 RX ADMIN — CEFTRIAXONE 100 GRAM(S): 500 INJECTION, POWDER, FOR SOLUTION INTRAMUSCULAR; INTRAVENOUS at 05:22

## 2019-02-23 RX ADMIN — Medication 3 MILLILITER(S): at 22:54

## 2019-02-23 RX ADMIN — METHOCARBAMOL 750 MILLIGRAM(S): 500 TABLET, FILM COATED ORAL at 21:10

## 2019-02-23 RX ADMIN — ROTIGOTINE 1 PATCH: 8 PATCH, EXTENDED RELEASE TRANSDERMAL at 22:24

## 2019-02-23 RX ADMIN — Medication 0.5 MILLIGRAM(S): at 17:40

## 2019-02-23 RX ADMIN — AZITHROMYCIN 250 MILLIGRAM(S): 500 TABLET, FILM COATED ORAL at 12:58

## 2019-02-23 RX ADMIN — ENOXAPARIN SODIUM 60 MILLIGRAM(S): 100 INJECTION SUBCUTANEOUS at 17:41

## 2019-02-23 RX ADMIN — Medication 3 MILLILITER(S): at 17:41

## 2019-02-23 RX ADMIN — Medication 0.5 MILLIGRAM(S): at 05:21

## 2019-02-23 RX ADMIN — ROTIGOTINE 1 PATCH: 8 PATCH, EXTENDED RELEASE TRANSDERMAL at 09:08

## 2019-02-23 RX ADMIN — CARBIDOPA AND LEVODOPA 1 TABLET(S): 25; 100 TABLET ORAL at 12:58

## 2019-02-23 RX ADMIN — OXYCODONE AND ACETAMINOPHEN 1 TABLET(S): 5; 325 TABLET ORAL at 09:50

## 2019-02-23 RX ADMIN — CARBIDOPA AND LEVODOPA 1 TABLET(S): 25; 100 TABLET ORAL at 21:10

## 2019-02-23 RX ADMIN — Medication 650 MILLIGRAM(S): at 23:43

## 2019-02-23 RX ADMIN — OXYCODONE AND ACETAMINOPHEN 1 TABLET(S): 5; 325 TABLET ORAL at 17:40

## 2019-02-23 RX ADMIN — METHOCARBAMOL 750 MILLIGRAM(S): 500 TABLET, FILM COATED ORAL at 05:21

## 2019-02-23 RX ADMIN — OXYCODONE HYDROCHLORIDE 2.5 MILLIGRAM(S): 5 TABLET ORAL at 00:00

## 2019-02-23 RX ADMIN — CARBIDOPA AND LEVODOPA 1 TABLET(S): 25; 100 TABLET ORAL at 05:21

## 2019-02-23 RX ADMIN — POLYETHYLENE GLYCOL 3350 17 GRAM(S): 17 POWDER, FOR SOLUTION ORAL at 12:58

## 2019-02-23 RX ADMIN — PREGABALIN 1000 MICROGRAM(S): 225 CAPSULE ORAL at 20:01

## 2019-02-23 RX ADMIN — METHOCARBAMOL 750 MILLIGRAM(S): 500 TABLET, FILM COATED ORAL at 12:58

## 2019-02-23 RX ADMIN — Medication 100 MILLIGRAM(S): at 12:58

## 2019-02-23 RX ADMIN — Medication 3 MILLILITER(S): at 05:20

## 2019-02-23 RX ADMIN — ROTIGOTINE 1 PATCH: 8 PATCH, EXTENDED RELEASE TRANSDERMAL at 17:41

## 2019-02-23 RX ADMIN — CARBIDOPA AND LEVODOPA 1 TABLET(S): 25; 100 TABLET ORAL at 01:10

## 2019-02-23 RX ADMIN — OXYCODONE AND ACETAMINOPHEN 1 TABLET(S): 5; 325 TABLET ORAL at 11:00

## 2019-02-23 RX ADMIN — CARBIDOPA AND LEVODOPA 1 TABLET(S): 25; 100 TABLET ORAL at 09:24

## 2019-02-23 RX ADMIN — ROTIGOTINE 1 PATCH: 8 PATCH, EXTENDED RELEASE TRANSDERMAL at 17:50

## 2019-02-23 RX ADMIN — Medication 3 MILLILITER(S): at 12:57

## 2019-02-23 RX ADMIN — OXYCODONE AND ACETAMINOPHEN 1 TABLET(S): 5; 325 TABLET ORAL at 05:21

## 2019-02-23 NOTE — SWALLOW BEDSIDE ASSESSMENT ADULT - SWALLOW EVAL: DIAGNOSIS
Patient is Macedonian speaking; evaluation conducted in Macedonian as this clinician is a conversational speaker.  Pt encountered awake and alert, upright in chair, on RA, A&Ox3, +mild masked facies, +general tremor, +overall stiffness.  Attempted to conduct swallow evaluation, however pt stated he "cannot eat until his medication starts working."  Pt stated he only took his medication (for PD) approx. 10 minutes ago and requested SLP come back after medication "begins working."  SLP returned approx 40 minutes later, but pt requested SLP come back tomorrow 2/24 for complete assessment.  This clinician to respect pt's wishes and re-attempt tomorrow, 2/24. Attempted to conduct swallow evaluation, however pt stated he "cannot eat until his medication starts working."  Pt stated he only took his medication (for PD) approx. 10 minutes ago and requested SLP come back after medication "begins working."  SLP returned approx 40 minutes later, but pt requested SLP come back tomorrow 2/24 for complete assessment.  This clinician to respect pt's wishes and re-attempt tomorrow, 2/24.

## 2019-02-24 PROCEDURE — 73030 X-RAY EXAM OF SHOULDER: CPT | Mod: 26,RT

## 2019-02-24 RX ADMIN — AZITHROMYCIN 250 MILLIGRAM(S): 500 TABLET, FILM COATED ORAL at 13:12

## 2019-02-24 RX ADMIN — Medication 3 MILLILITER(S): at 17:08

## 2019-02-24 RX ADMIN — Medication 0.5 MILLIGRAM(S): at 17:10

## 2019-02-24 RX ADMIN — POLYETHYLENE GLYCOL 3350 17 GRAM(S): 17 POWDER, FOR SOLUTION ORAL at 13:13

## 2019-02-24 RX ADMIN — RIVAROXABAN 15 MILLIGRAM(S): KIT at 05:01

## 2019-02-24 RX ADMIN — Medication 0.5 MILLIGRAM(S): at 05:02

## 2019-02-24 RX ADMIN — RIVAROXABAN 15 MILLIGRAM(S): KIT at 17:09

## 2019-02-24 RX ADMIN — ROTIGOTINE 1 PATCH: 8 PATCH, EXTENDED RELEASE TRANSDERMAL at 17:12

## 2019-02-24 RX ADMIN — CARBIDOPA AND LEVODOPA 1 TABLET(S): 25; 100 TABLET ORAL at 13:12

## 2019-02-24 RX ADMIN — OXYCODONE AND ACETAMINOPHEN 1 TABLET(S): 5; 325 TABLET ORAL at 05:01

## 2019-02-24 RX ADMIN — METHOCARBAMOL 750 MILLIGRAM(S): 500 TABLET, FILM COATED ORAL at 13:13

## 2019-02-24 RX ADMIN — Medication 3 MILLILITER(S): at 13:11

## 2019-02-24 RX ADMIN — OXYCODONE AND ACETAMINOPHEN 1 TABLET(S): 5; 325 TABLET ORAL at 17:12

## 2019-02-24 RX ADMIN — CARBIDOPA AND LEVODOPA 1 TABLET(S): 25; 100 TABLET ORAL at 17:09

## 2019-02-24 RX ADMIN — METHOCARBAMOL 750 MILLIGRAM(S): 500 TABLET, FILM COATED ORAL at 05:01

## 2019-02-24 RX ADMIN — CEFTRIAXONE 100 GRAM(S): 500 INJECTION, POWDER, FOR SOLUTION INTRAMUSCULAR; INTRAVENOUS at 05:06

## 2019-02-24 RX ADMIN — METHOCARBAMOL 750 MILLIGRAM(S): 500 TABLET, FILM COATED ORAL at 21:11

## 2019-02-24 RX ADMIN — CARBIDOPA AND LEVODOPA 1 TABLET(S): 25; 100 TABLET ORAL at 05:01

## 2019-02-24 RX ADMIN — CARBIDOPA AND LEVODOPA 1 TABLET(S): 25; 100 TABLET ORAL at 09:30

## 2019-02-24 RX ADMIN — PREGABALIN 1000 MICROGRAM(S): 225 CAPSULE ORAL at 13:13

## 2019-02-24 RX ADMIN — CARBIDOPA AND LEVODOPA 1 TABLET(S): 25; 100 TABLET ORAL at 21:11

## 2019-02-24 RX ADMIN — ROTIGOTINE 1 PATCH: 8 PATCH, EXTENDED RELEASE TRANSDERMAL at 17:18

## 2019-02-24 RX ADMIN — CARBIDOPA AND LEVODOPA 1 TABLET(S): 25; 100 TABLET ORAL at 00:38

## 2019-02-24 RX ADMIN — Medication 3 MILLILITER(S): at 05:01

## 2019-02-24 RX ADMIN — Medication 100 MILLIGRAM(S): at 17:09

## 2019-02-24 RX ADMIN — Medication 650 MILLIGRAM(S): at 00:18

## 2019-02-24 NOTE — SWALLOW BEDSIDE ASSESSMENT ADULT - SLP GENERAL OBSERVATIONS
Patient is Greenlandic speaking; conversation conducted in Greenlandic as this clinician is a conversational speaker.  Pt encountered awake and alert, upright in chair, on RA, A&Ox3, +mild masked facies, +general tremor, +overall stiffness, +hypokinetic dysarthria.  Able to follow commands for evaluation purposes and answered simple questions appropriately.
Patient is Tajik speaking; conversation conducted in Tajik as this clinician is a conversational speaker.  Pt encountered awake and alert, upright in chair, on RA, A&Ox3, +mild masked facies, +general tremor, +overall stiffness, +hypokinetic dysarthria.

## 2019-02-24 NOTE — SWALLOW BEDSIDE ASSESSMENT ADULT - SWALLOW EVAL: PATIENT/FAMILY GOALS STATEMENT
Patient reported intermittent difficulty swallowing and feeling as if his throat is "narrow" and "stiff."  Denied coughing during PO intake and/or history of swallow evaluations/treatment, however pt judged to be a poor historian.  This clinician called [facility] as this is the hospital that pt frequents in an attempt to obtain further history, but  stated there is no Speech Pathologist on the weekends.
As per pt report his swallowing abilities fluctuate depending on the time he receives his PD medications.  Stated that there are times he feels more "stiff" and does not eat as he is then unable to chew.  Denied hx of dysphagia w/u.

## 2019-02-24 NOTE — SWALLOW BEDSIDE ASSESSMENT ADULT - ASR SWALLOW RECOMMEND DIAG
If aspiration PNA is part of ddx would recommend instrumental swallow study to r/o aspiration.  As per discussion with Attending, Dr. Reich, there is no indication for further dysphagia w/u at this time.

## 2019-02-24 NOTE — SWALLOW BEDSIDE ASSESSMENT ADULT - ASR SWALLOW ASPIRATION MONITOR
Monitor for s/s aspiration/laryngeal penetration. If noted:  D/C p.o. intake, provide non-oral nutrition/hydration/meds, and contact this service @ [phone]/change of breathing pattern/gurgly voice/pneumonia/upper respiratory infection/cough/fever/throat clearing

## 2019-02-24 NOTE — SWALLOW BEDSIDE ASSESSMENT ADULT - SWALLOW EVAL: DIAGNOSIS
Patient presents with 1) adequate mastication skills, timely oral transit and trigger of the swallow, adequate laryngeal elevation upon palpation, complete oral clearance post swallow, and no overt s/s of laryngeal penetration/aspiration, however silent aspiration cannot be r/o at bedside. 2) hypokinetic dysarthria.

## 2019-02-24 NOTE — SWALLOW BEDSIDE ASSESSMENT ADULT - SPECIFY REASON(S)
to subjectively assess swallow mechanism; r/o dysphagia
to subjectively assess swallow mechanism; r/o dysphagia

## 2019-02-24 NOTE — CHART NOTE - NSCHARTNOTEFT_GEN_A_CORE
Dr. Reich contacted regarding starting pt on Xarelto for PE, and is agreeable  Xarelto 15 mg PO BID ordered to start @ 06:00 2/24    -Will continue to monitor    Aishwarya Jameson PA-C  Dept. of Medicine

## 2019-02-25 LAB
ALDOLASE SERPL-CCNC: 10.6 U/L — HIGH (ref 3.3–10.3)
ALDOLASE SERPL-CCNC: 6.9 U/L — SIGNIFICANT CHANGE UP (ref 3.3–10.3)
ANION GAP SERPL CALC-SCNC: 14 MMOL/L — SIGNIFICANT CHANGE UP (ref 5–17)
BUN SERPL-MCNC: 8 MG/DL — SIGNIFICANT CHANGE UP (ref 7–23)
CALCIUM SERPL-MCNC: 9.9 MG/DL — SIGNIFICANT CHANGE UP (ref 8.4–10.5)
CHLORIDE SERPL-SCNC: 100 MMOL/L — SIGNIFICANT CHANGE UP (ref 96–108)
CO2 SERPL-SCNC: 26 MMOL/L — SIGNIFICANT CHANGE UP (ref 22–31)
CREAT SERPL-MCNC: 0.8 MG/DL — SIGNIFICANT CHANGE UP (ref 0.5–1.3)
GLUCOSE SERPL-MCNC: 89 MG/DL — SIGNIFICANT CHANGE UP (ref 70–99)
HCT VFR BLD CALC: 43.9 % — SIGNIFICANT CHANGE UP (ref 39–50)
HGB BLD-MCNC: 14.6 G/DL — SIGNIFICANT CHANGE UP (ref 13–17)
MCHC RBC-ENTMCNC: 31.5 PG — SIGNIFICANT CHANGE UP (ref 27–34)
MCHC RBC-ENTMCNC: 33.3 GM/DL — SIGNIFICANT CHANGE UP (ref 32–36)
MCV RBC AUTO: 94.6 FL — SIGNIFICANT CHANGE UP (ref 80–100)
PLATELET # BLD AUTO: 249 K/UL — SIGNIFICANT CHANGE UP (ref 150–400)
POTASSIUM SERPL-MCNC: 3.8 MMOL/L — SIGNIFICANT CHANGE UP (ref 3.5–5.3)
POTASSIUM SERPL-SCNC: 3.8 MMOL/L — SIGNIFICANT CHANGE UP (ref 3.5–5.3)
RBC # BLD: 4.64 M/UL — SIGNIFICANT CHANGE UP (ref 4.2–5.8)
RBC # FLD: 12.7 % — SIGNIFICANT CHANGE UP (ref 10.3–14.5)
SODIUM SERPL-SCNC: 140 MMOL/L — SIGNIFICANT CHANGE UP (ref 135–145)
WBC # BLD: 6.76 K/UL — SIGNIFICANT CHANGE UP (ref 3.8–10.5)
WBC # FLD AUTO: 6.76 K/UL — SIGNIFICANT CHANGE UP (ref 3.8–10.5)

## 2019-02-25 PROCEDURE — 71045 X-RAY EXAM CHEST 1 VIEW: CPT | Mod: 26

## 2019-02-25 RX ORDER — OXYCODONE HYDROCHLORIDE 5 MG/1
2.5 TABLET ORAL ONCE
Qty: 0 | Refills: 0 | Status: DISCONTINUED | OUTPATIENT
Start: 2019-02-25 | End: 2019-02-25

## 2019-02-25 RX ORDER — ROTIGOTINE 8 MG/24H
1 PATCH, EXTENDED RELEASE TRANSDERMAL EVERY 24 HOURS
Qty: 0 | Refills: 0 | Status: DISCONTINUED | OUTPATIENT
Start: 2019-02-25 | End: 2019-02-26

## 2019-02-25 RX ORDER — DIAZEPAM 5 MG
5 TABLET ORAL ONCE
Qty: 0 | Refills: 0 | Status: DISCONTINUED | OUTPATIENT
Start: 2019-02-25 | End: 2019-02-25

## 2019-02-25 RX ADMIN — CARBIDOPA AND LEVODOPA 1 TABLET(S): 25; 100 TABLET ORAL at 13:03

## 2019-02-25 RX ADMIN — ROTIGOTINE 1 PATCH: 8 PATCH, EXTENDED RELEASE TRANSDERMAL at 08:13

## 2019-02-25 RX ADMIN — METHOCARBAMOL 750 MILLIGRAM(S): 500 TABLET, FILM COATED ORAL at 13:02

## 2019-02-25 RX ADMIN — Medication 100 MILLIGRAM(S): at 13:02

## 2019-02-25 RX ADMIN — CEFTRIAXONE 100 GRAM(S): 500 INJECTION, POWDER, FOR SOLUTION INTRAMUSCULAR; INTRAVENOUS at 05:31

## 2019-02-25 RX ADMIN — AZITHROMYCIN 250 MILLIGRAM(S): 500 TABLET, FILM COATED ORAL at 13:03

## 2019-02-25 RX ADMIN — Medication 3 MILLILITER(S): at 13:03

## 2019-02-25 RX ADMIN — Medication 2 MILLIGRAM(S): at 13:03

## 2019-02-25 RX ADMIN — Medication 3 MILLILITER(S): at 05:22

## 2019-02-25 RX ADMIN — RIVAROXABAN 15 MILLIGRAM(S): KIT at 17:19

## 2019-02-25 RX ADMIN — Medication 0.5 MILLIGRAM(S): at 17:19

## 2019-02-25 RX ADMIN — CARBIDOPA AND LEVODOPA 1 TABLET(S): 25; 100 TABLET ORAL at 21:08

## 2019-02-25 RX ADMIN — RIVAROXABAN 15 MILLIGRAM(S): KIT at 05:24

## 2019-02-25 RX ADMIN — OXYCODONE AND ACETAMINOPHEN 1 TABLET(S): 5; 325 TABLET ORAL at 05:27

## 2019-02-25 RX ADMIN — CARBIDOPA AND LEVODOPA 1 TABLET(S): 25; 100 TABLET ORAL at 01:02

## 2019-02-25 RX ADMIN — METHOCARBAMOL 750 MILLIGRAM(S): 500 TABLET, FILM COATED ORAL at 05:22

## 2019-02-25 RX ADMIN — Medication 3 MILLILITER(S): at 17:19

## 2019-02-25 RX ADMIN — Medication 0.5 MILLIGRAM(S): at 05:23

## 2019-02-25 RX ADMIN — OXYCODONE HYDROCHLORIDE 2.5 MILLIGRAM(S): 5 TABLET ORAL at 01:42

## 2019-02-25 RX ADMIN — ROTIGOTINE 1 PATCH: 8 PATCH, EXTENDED RELEASE TRANSDERMAL at 21:39

## 2019-02-25 RX ADMIN — METHOCARBAMOL 750 MILLIGRAM(S): 500 TABLET, FILM COATED ORAL at 21:08

## 2019-02-25 RX ADMIN — CARBIDOPA AND LEVODOPA 1 TABLET(S): 25; 100 TABLET ORAL at 05:22

## 2019-02-25 RX ADMIN — CARBIDOPA AND LEVODOPA 1 TABLET(S): 25; 100 TABLET ORAL at 17:19

## 2019-02-25 RX ADMIN — POLYETHYLENE GLYCOL 3350 17 GRAM(S): 17 POWDER, FOR SOLUTION ORAL at 13:03

## 2019-02-25 RX ADMIN — OXYCODONE AND ACETAMINOPHEN 1 TABLET(S): 5; 325 TABLET ORAL at 17:19

## 2019-02-25 RX ADMIN — CARBIDOPA AND LEVODOPA 1 TABLET(S): 25; 100 TABLET ORAL at 10:39

## 2019-02-26 ENCOUNTER — TRANSCRIPTION ENCOUNTER (OUTPATIENT)
Age: 49
End: 2019-02-26

## 2019-02-26 VITALS
RESPIRATION RATE: 18 BRPM | DIASTOLIC BLOOD PRESSURE: 67 MMHG | HEART RATE: 79 BPM | TEMPERATURE: 98 F | OXYGEN SATURATION: 99 % | SYSTOLIC BLOOD PRESSURE: 116 MMHG

## 2019-02-26 LAB
ANION GAP SERPL CALC-SCNC: 16 MMOL/L — SIGNIFICANT CHANGE UP (ref 5–17)
BUN SERPL-MCNC: 10 MG/DL — SIGNIFICANT CHANGE UP (ref 7–23)
CALCIUM SERPL-MCNC: 9.4 MG/DL — SIGNIFICANT CHANGE UP (ref 8.4–10.5)
CHLORIDE SERPL-SCNC: 101 MMOL/L — SIGNIFICANT CHANGE UP (ref 96–108)
CO2 SERPL-SCNC: 25 MMOL/L — SIGNIFICANT CHANGE UP (ref 22–31)
CREAT SERPL-MCNC: 0.81 MG/DL — SIGNIFICANT CHANGE UP (ref 0.5–1.3)
GLUCOSE SERPL-MCNC: 95 MG/DL — SIGNIFICANT CHANGE UP (ref 70–99)
HCT VFR BLD CALC: 40.9 % — SIGNIFICANT CHANGE UP (ref 39–50)
HGB BLD-MCNC: 13.2 G/DL — SIGNIFICANT CHANGE UP (ref 13–17)
MCHC RBC-ENTMCNC: 30.6 PG — SIGNIFICANT CHANGE UP (ref 27–34)
MCHC RBC-ENTMCNC: 32.3 GM/DL — SIGNIFICANT CHANGE UP (ref 32–36)
MCV RBC AUTO: 94.9 FL — SIGNIFICANT CHANGE UP (ref 80–100)
PLATELET # BLD AUTO: 233 K/UL — SIGNIFICANT CHANGE UP (ref 150–400)
POTASSIUM SERPL-MCNC: 3.9 MMOL/L — SIGNIFICANT CHANGE UP (ref 3.5–5.3)
POTASSIUM SERPL-SCNC: 3.9 MMOL/L — SIGNIFICANT CHANGE UP (ref 3.5–5.3)
RBC # BLD: 4.31 M/UL — SIGNIFICANT CHANGE UP (ref 4.2–5.8)
RBC # FLD: 12.4 % — SIGNIFICANT CHANGE UP (ref 10.3–14.5)
SODIUM SERPL-SCNC: 142 MMOL/L — SIGNIFICANT CHANGE UP (ref 135–145)
WBC # BLD: 5.96 K/UL — SIGNIFICANT CHANGE UP (ref 3.8–10.5)
WBC # FLD AUTO: 5.96 K/UL — SIGNIFICANT CHANGE UP (ref 3.8–10.5)

## 2019-02-26 PROCEDURE — 80048 BASIC METABOLIC PNL TOTAL CA: CPT

## 2019-02-26 PROCEDURE — 97161 PT EVAL LOW COMPLEX 20 MIN: CPT

## 2019-02-26 PROCEDURE — 84443 ASSAY THYROID STIM HORMONE: CPT

## 2019-02-26 PROCEDURE — 82803 BLOOD GASES ANY COMBINATION: CPT

## 2019-02-26 PROCEDURE — 85014 HEMATOCRIT: CPT

## 2019-02-26 PROCEDURE — 84295 ASSAY OF SERUM SODIUM: CPT

## 2019-02-26 PROCEDURE — 82550 ASSAY OF CK (CPK): CPT

## 2019-02-26 PROCEDURE — 73030 X-RAY EXAM OF SHOULDER: CPT

## 2019-02-26 PROCEDURE — 82607 VITAMIN B-12: CPT

## 2019-02-26 PROCEDURE — 85610 PROTHROMBIN TIME: CPT

## 2019-02-26 PROCEDURE — 80053 COMPREHEN METABOLIC PANEL: CPT

## 2019-02-26 PROCEDURE — 82947 ASSAY GLUCOSE BLOOD QUANT: CPT

## 2019-02-26 PROCEDURE — 72040 X-RAY EXAM NECK SPINE 2-3 VW: CPT

## 2019-02-26 PROCEDURE — 84484 ASSAY OF TROPONIN QUANT: CPT

## 2019-02-26 PROCEDURE — 36415 COLL VENOUS BLD VENIPUNCTURE: CPT

## 2019-02-26 PROCEDURE — 94640 AIRWAY INHALATION TREATMENT: CPT

## 2019-02-26 PROCEDURE — 97530 THERAPEUTIC ACTIVITIES: CPT

## 2019-02-26 PROCEDURE — 71045 X-RAY EXAM CHEST 1 VIEW: CPT

## 2019-02-26 PROCEDURE — 84132 ASSAY OF SERUM POTASSIUM: CPT

## 2019-02-26 PROCEDURE — 82085 ASSAY OF ALDOLASE: CPT

## 2019-02-26 PROCEDURE — 93005 ELECTROCARDIOGRAM TRACING: CPT

## 2019-02-26 PROCEDURE — 83880 ASSAY OF NATRIURETIC PEPTIDE: CPT

## 2019-02-26 PROCEDURE — 83735 ASSAY OF MAGNESIUM: CPT

## 2019-02-26 PROCEDURE — 82435 ASSAY OF BLOOD CHLORIDE: CPT

## 2019-02-26 PROCEDURE — 85027 COMPLETE CBC AUTOMATED: CPT

## 2019-02-26 PROCEDURE — 83605 ASSAY OF LACTIC ACID: CPT

## 2019-02-26 PROCEDURE — 87040 BLOOD CULTURE FOR BACTERIA: CPT

## 2019-02-26 PROCEDURE — 82746 ASSAY OF FOLIC ACID SERUM: CPT

## 2019-02-26 PROCEDURE — 86780 TREPONEMA PALLIDUM: CPT

## 2019-02-26 PROCEDURE — 85379 FIBRIN DEGRADATION QUANT: CPT

## 2019-02-26 PROCEDURE — 71275 CT ANGIOGRAPHY CHEST: CPT

## 2019-02-26 PROCEDURE — 82330 ASSAY OF CALCIUM: CPT

## 2019-02-26 PROCEDURE — 92610 EVALUATE SWALLOWING FUNCTION: CPT

## 2019-02-26 PROCEDURE — 99285 EMERGENCY DEPT VISIT HI MDM: CPT

## 2019-02-26 PROCEDURE — 97116 GAIT TRAINING THERAPY: CPT

## 2019-02-26 RX ORDER — CARBIDOPA AND LEVODOPA 25; 100 MG/1; MG/1
1 TABLET ORAL
Qty: 0 | Refills: 0 | COMMUNITY
Start: 2019-02-26

## 2019-02-26 RX ORDER — IPRATROPIUM/ALBUTEROL SULFATE 18-103MCG
3 AEROSOL WITH ADAPTER (GRAM) INHALATION
Qty: 0 | Refills: 0 | COMMUNITY
Start: 2019-02-26

## 2019-02-26 RX ORDER — TRIHEXYPHENIDYL HCL 2 MG
0.5 TABLET ORAL
Qty: 0 | Refills: 0 | COMMUNITY
Start: 2019-02-26

## 2019-02-26 RX ORDER — AMANTADINE HCL 100 MG
1 CAPSULE ORAL
Qty: 0 | Refills: 0 | COMMUNITY
Start: 2019-02-26

## 2019-02-26 RX ORDER — METHOCARBAMOL 500 MG/1
1 TABLET, FILM COATED ORAL
Qty: 0 | Refills: 0 | COMMUNITY
Start: 2019-02-26

## 2019-02-26 RX ORDER — RIVAROXABAN 15 MG-20MG
1 KIT ORAL
Qty: 0 | Refills: 0 | COMMUNITY

## 2019-02-26 RX ORDER — RIVAROXABAN 15 MG-20MG
1 KIT ORAL
Qty: 0 | Refills: 0 | COMMUNITY
Start: 2019-02-26

## 2019-02-26 RX ORDER — POLYETHYLENE GLYCOL 3350 17 G/17G
17 POWDER, FOR SOLUTION ORAL
Qty: 0 | Refills: 0 | COMMUNITY
Start: 2019-02-26

## 2019-02-26 RX ORDER — ROTIGOTINE 8 MG/24H
1 PATCH, EXTENDED RELEASE TRANSDERMAL
Qty: 0 | Refills: 0 | COMMUNITY
Start: 2019-02-26

## 2019-02-26 RX ADMIN — POLYETHYLENE GLYCOL 3350 17 GRAM(S): 17 POWDER, FOR SOLUTION ORAL at 13:22

## 2019-02-26 RX ADMIN — CARBIDOPA AND LEVODOPA 1 TABLET(S): 25; 100 TABLET ORAL at 09:24

## 2019-02-26 RX ADMIN — Medication 0.5 MILLIGRAM(S): at 05:09

## 2019-02-26 RX ADMIN — Medication 100 MILLIGRAM(S): at 13:22

## 2019-02-26 RX ADMIN — CARBIDOPA AND LEVODOPA 1 TABLET(S): 25; 100 TABLET ORAL at 04:36

## 2019-02-26 RX ADMIN — Medication 3 MILLILITER(S): at 05:09

## 2019-02-26 RX ADMIN — CARBIDOPA AND LEVODOPA 1 TABLET(S): 25; 100 TABLET ORAL at 00:46

## 2019-02-26 RX ADMIN — METHOCARBAMOL 750 MILLIGRAM(S): 500 TABLET, FILM COATED ORAL at 05:09

## 2019-02-26 RX ADMIN — OXYCODONE AND ACETAMINOPHEN 1 TABLET(S): 5; 325 TABLET ORAL at 04:35

## 2019-02-26 RX ADMIN — METHOCARBAMOL 750 MILLIGRAM(S): 500 TABLET, FILM COATED ORAL at 13:22

## 2019-02-26 RX ADMIN — RIVAROXABAN 15 MILLIGRAM(S): KIT at 05:09

## 2019-02-26 RX ADMIN — CARBIDOPA AND LEVODOPA 1 TABLET(S): 25; 100 TABLET ORAL at 13:22

## 2019-02-26 RX ADMIN — Medication 3 MILLILITER(S): at 13:23

## 2019-02-26 RX ADMIN — Medication 3 MILLILITER(S): at 00:46

## 2019-02-26 NOTE — PROGRESS NOTE ADULT - ASSESSMENT
48 year old male w/ past medical hx of Parkinson's disease and dystonia who was brought in by EMS secondary to shoulder pain. Patient initially stated he had b/l shoulder pain that started at 1PM, but then requested  services.  ID # [id number] was used. When a  was used, patient reported that he has been having mostly L shoulder pain for the last 1 month. He stated he fell down in his house side-ways secondary to gait imbalance. He did not his head, or lose consciousness during that fall. He reports 3 falls in the last year, that he attributes to gait imbalance secondary to Parkinson's disease. He requested muscle relaxers at one point. He describes his shoulder pain as dull and stabbing pain that the patient localizes to L anterior shoulder that is constant (8.5/10 in severity). He reports nothing makes the pain better or worse. He reports he tried a patch for his shoulder pain, but that did not help.    pt. is having frequent hospitalization , 4 times in last 2 months , last admission was for psych admission (20 Feb 2019 23:38)    Spoke to pt with pts cousin via video phone    pt is brought here for bilateral shoulder pain: Incidental he was found to have infiltrates on ct chest and hence pulmonary called: He no cough, no phlegm an dno chest pain: He was recnetly dced from [facility] where he was treated for parkinsosn disease: He has no clinical features of pneumonia
48 year old male w/ past medical hx of Parkinson's disease and dystonia who was brought in by EMS secondary to shoulder pain. Patient initially stated he had b/l shoulder pain that started at 1PM, but then requested  services.  ID # [id number] was used. When a  was used, patient reported that he has been having mostly L shoulder pain for the last 1 month. He stated he fell down in his house side-ways secondary to gait imbalance. He did not his head, or lose consciousness during that fall. He reports 3 falls in the last year, that he attributes to gait imbalance secondary to Parkinson's disease. He requested muscle relaxers at one point. He describes his shoulder pain as dull and stabbing pain that the patient localizes to L anterior shoulder that is constant (8.5/10 in severity). He reports nothing makes the pain better or worse. He reports he tried a patch for his shoulder pain, but that did not help.    pt. is having frequent hospitalization , 4 times in last 2 months , last admission was for psych admission (20 Feb 2019 23:38)    Spoke to pt with pts cousin via video phone    pt is brought here for bilateral shoulder pain: Incidental he was found to have infiltrates on ct chest and hence pulmonary called: He no cough, no phlegm an dno chest pain: He was recnetly dced from [facility] where he was treated for parkinsosn disease: He has no clinical features of pneumonia
48 year old male w/ past medical hx of Parkinson's disease and dystonia who was brought in by EMS secondary to shoulder pain. Patient initially stated he had b/l shoulder pain that started at 1PM, but then requested  services.  ID # [id number] was used. When a  was used, patient reported that he has been having mostly L shoulder pain for the last 1 month. He stated he fell down in his house side-ways secondary to gait imbalance. He did not his head, or lose consciousness during that fall. He reports 3 falls in the last year, that he attributes to gait imbalance secondary to Parkinson's disease. He requested muscle relaxers at one point. He describes his shoulder pain as dull and stabbing pain that the patient localizes to L anterior shoulder that is constant (8.5/10 in severity). He reports nothing makes the pain better or worse. He reports he tried a patch for his shoulder pain, but that did not help.    pt. is having frequent hospitalization , 4 times in last 2 months , last admission was for psych admission (20 Feb 2019 23:38)    Spoke to pt with pts cousin via video phone    pt is brought here for bilateral shoulder pain: Incidental he was found to have infiltrates on ct chest and hence pulmonary called: He no cough, no phlegm an dno chest pain: He was recnetly dced from [facility] where he was treated for parkinsosn disease: He has no clinical features of pneumonia
48 year old male w/ past medical hx of Parkinson's disease and dystonia who was brought in by EMS secondary to shoulder pain. Patient initially stated he had b/l shoulder pain that started at 1PM, but then requested  services.  ID # [id number] was used. When a  was used, patient reported that he has been having mostly L shoulder pain for the last 1 month. He stated he fell down in his house side-ways secondary to gait imbalance. He did not his head, or lose consciousness during that fall. He reports 3 falls in the last year, that he attributes to gait imbalance secondary to Parkinson's disease. He requested muscle relaxers at one point. He describes his shoulder pain as dull and stabbing pain that the patient localizes to L anterior shoulder that is constant (8.5/10 in severity). He reports nothing makes the pain better or worse. He reports he tried a patch for his shoulder pain, but that did not help.    pt. is having frequent hospitalization , 4 times in last 2 months , last admission was for psych admission (20 Feb 2019 23:38)    Spoke to pt with pts cousin via video phone    pt is brought here for bilateral shoulder pain: Incidental he was found to have infiltrates on ct chest and hence pulmonary called: He no cough, no phlegm an dno chest pain: He was recnetly dced from [facility] where he was treated for parkinsosn disease: He has no clinical features of pneumonia

## 2019-02-26 NOTE — DISCHARGE NOTE ADULT - CARE PLAN
Principal Discharge DX:	Inability to walk  Goal:	full resolution  Assessment and plan of treatment:	Follow up with in [facility]  Secondary Diagnosis:	Acute pulmonary embolism without acute cor pulmonale, unspecified pulmonary embolism type  Goal:	cont Xarelto as ordered  Assessment and plan of treatment:	> Xarelto 15mg bid was started on 2/24/19- per protocol; Patient received 5 /42 doses then Xarelto 20mg daily.   >Take your anticoagulation (lovenox, coumadin) as directed.  Follow up with your health care provider within one week. Call for appointment.  If you develop shortness of breath or if your shortness of breath worsens call your Health Care Provider or go to the Emergency Department.  > Follow up out patient with pulmonary for ongoing  management  Secondary Diagnosis:	Pneumonia, bacterial  Goal:	resolved  Assessment and plan of treatment:	s/p antibiotics  Secondary Diagnosis:	Parkinson disease  Goal:	cont home meds  Secondary Diagnosis:	Shoulder pain, right  Goal:	negative for acute fracture or dislocation  Secondary Diagnosis:	Dementia

## 2019-02-26 NOTE — PROGRESS NOTE ADULT - PROBLEM SELECTOR PROBLEM 1
Pulmonary embolism
Inability to walk
Pulmonary embolism

## 2019-02-26 NOTE — PROGRESS NOTE ADULT - PROBLEM SELECTOR PLAN 6
pain is controlled
started on full dose lovenox   -switch to xeralto from am   -check venous doppler of LE and TTE
xeralto for AC   -check venous doppler of LE and TTE
pain is controlled

## 2019-02-26 NOTE — PROGRESS NOTE ADULT - PROBLEM SELECTOR PROBLEM 4
Parkinson disease
Dementia
Parkinson disease

## 2019-02-26 NOTE — PROGRESS NOTE ADULT - PROBLEM SELECTOR PLAN 1
on lovenox: check dopplers of Bilaterals LE as well as echocardiogram
on lovenox: check dopplers of Bilaterals LE as well as echocardiogram  2/23; to switch to xarelto today  2/24: tolerating Xarelto
on lovenox: check dopplers of Bilaterals LE as well as echocardiogram  2/23; to switch to xarelto today  2/24: tolerating Xarelto  2/26: on xarelto
will need PT eval  -likely 2/2 pain and worsening parkinson's dis
on lovenox: check dopplers of Bilaterals LE as well as echocardiogram  2/23; to switch to xarelto today

## 2019-02-26 NOTE — PROGRESS NOTE ADULT - PROBLEM SELECTOR PLAN 4
cont curetn rx
cont curetn rx  2/24 cont current rx
cont curetn rx  2/24 cont current rx  2/26: Cont rx per neurology
supportive care
cont curetn rx

## 2019-02-26 NOTE — DISCHARGE NOTE ADULT - PROVIDER TOKENS
PROVIDER:[TOKEN:[[id number]]],FREE:[LAST:[Neurology at [facility]],PHONE:[(   )    -],FAX:[(   )    -]]

## 2019-02-26 NOTE — DISCHARGE NOTE ADULT - CARE PROVIDER_API CALL
Alexandru Baum)  Critical Care Medicine; Internal Medicine; Pulmonary Disease  [address] [place] [address]  Phone: [phone]  Fax: [phone]  Follow Up Time:     Neurology at [facility],   Phone: (   )    -  Fax: (   )    -  Follow Up Time:

## 2019-02-26 NOTE — PROGRESS NOTE ADULT - PROBLEM SELECTOR PLAN 3
Not very clear: he is on antibiotics at [facility]: would cont for now
Not very clear: he is on antibiotics at [facility]: would cont for now  2/23: afebrile as wellas normal WBC count: procalcitonin: pending: if normal: dc antibiotics: Blood cultures have been negative!  2/24: Seems to be doing ok : cont antibiotics: finish 7 days  2/26: resolved: last chest x-ray yesterday was clear: he hasno fever and his WBC count isnormal
Not very clear: he is on antibiotics at [facility]: would cont for now  2/23: afebrile as wellas normal WBC count: procalcitonin: pending: if normal: dc antibiotics: Blood cultures have been negative!  2/24: Seems to be doing ok : cont antibiotics: finish 7 days
c/w pain med s  -get xray
Not very clear: he is on antibiotics at [facility]: would cont for now  2/23: afebrile as wellas normal WBC count: procalcitonin: pending: if normal: dc antibiotics: Blood cultures have been negative!

## 2019-02-26 NOTE — PROGRESS NOTE ADULT - SUBJECTIVE AND OBJECTIVE BOX
[facility]        - Patient seen and examined.  " i feel good"          *****MEDICATIONS: Current medication reviewed and documented.    MEDICATIONS  (STANDING):  ALBUTerol/ipratropium for Nebulization 3 milliLiter(s) Nebulizer every 6 hours  amantadine 100 milliGRAM(s) Oral daily  carbidopa/levodopa  25/100 1 Tablet(s) Oral <User Schedule>  methocarbamol 750 milliGRAM(s) Oral three times a day  oxyCODONE    5 mG/acetaminophen 325 mG 1 Tablet(s) Oral every 12 hours  polyethylene glycol 3350 17 Gram(s) Oral daily  rivaroxaban 15 milliGRAM(s) Oral two times a day  rotigotine patch 4 mG/24 Hr(s) 1 Patch Transdermal every 24 hours  trihexyphenidyl 0.5 milliGRAM(s) Oral two times a day    MEDICATIONS  (PRN):           ***** REVIEW OF SYSTEM:  GEN: no fever, no chills, no pain  RESP: no SOB, no cough, no sputum  CVS: no chest pain, no palpitations, no edema  GI: no abdominal pain, no nausea, no vomiting, no constipation, no diarrhea  : no dysurea, no frequency  NEURO: no headache, no diziness  PSYCH: no depression, not anxious  Derm : no itching, no rash         ***** VITAL SIGNS:  T(F): 97.5 (19 @ 07:58), Max: 98 (19 @ 16:53)  HR: 79 (19 @ 07:58) (70 - 98)  BP: 116/67 (19 @ 07:58) (116/67 - 131/84)  RR: 18 (19 @ 07:58) (18 - 18)  SpO2: 99% (19 @ 07:58) (96% - 99%)  Wt(kg): --  ,   I&O's Summary    2019 07:01  -  2019 07:00  --------------------------------------------------------  IN: 960 mL / OUT: 0 mL / NET: 960 mL    2019 07:01  -  2019 13:21  --------------------------------------------------------  IN: 300 mL / OUT: 200 mL / NET: 100 mL             *****PHYSICAL EXAM:Alert oriented x 2  Attention comprehension are fair. Able to name, repeat, read without any difficulty.   Able to follow 1-2  step commands.  hypophonic      EOMI fundi not visualized,  VFF to confrontration  No facial asymmetry   Tongue is midline   Palate elevates symmetrically   Moving both upper ext spontaneously   increased tone throughout    not moving b/l lower ext to command does withdraw with pain..   Gait : not assessed.           *****LAB AND IMAGIN.2   5.96  )-----------( 233      ( 2019 09:47 )             40.9               02-26    142  |  101  |  10  ----------------------------<  95  3.9   |  25  |  0.81    Ca    9.4      2019 07:16                           [All pertinent recent Imaging/Reports reviewed]           *****A S S E S S M E N T   A N D   P L A N :    48 year old male w/ past medical hx of Parkinson's disease and dystonia who was brought in by EMS secondary to shoulder pain. Patient initially stated he had b/l shoulder pain that started at 1PM, but then requested  services.  ID # [id number] was used. When a  was used, patient reported that he has been having mostly L shoulder pain for the last 1 month. He stated he fell down in his house side-ways secondary to gait imbalance. He did not his head, or lose consciousness during that fall. He reports 3 falls in the last year, that he attributes to gait imbalance secondary to Parkinson's disease. He requested muscle relaxers at one point. He describes his shoulder pain as dull and stabbing pain that the patient localizes to L anterior shoulder that is constant (8.5/10 in severity). He reports nothing makes the pain better or worse. He reports he tried a patch for his shoulder pain, but that did not help.          Problem/Recommendations 1:Parkinson's disease   currently on sinemet 25/250 four times a day, methocarbamol, amantadine and neupro patch.  She showed me medication list from [facility] which states he was taking only 25/100 of sinemet there.  Will change to 25/100.    continue neupro 4mg ( as pt was very symptomatic with 2mg )    Wife is requesting transfer to [facility] as all his doctors are there.     Problem/Recommendations 2: Dystonia unclear etiology.   as per wife, he had extensive w/u and testing at [facility] recently.   pt was previously taking artane which was stopped abruptly per wife. Would start artane 0.5 bid   please obtain records from [facility] ASAP.      Pt will go to rehab on discharge,  no anticipated procedure or  surgery in the near future.  Pt has to be evaluated by Dr. Dye on discharge from rehab.      Problem/Recommendations 3: PE   on ac per pulmonary.     Thank you for allowing me to participate in the care of this patient. Please do not hesitate to call me if you have any  questions.        ________________  Elham Shah MD  [facility] ([facility]  [phone]      30 minutes spent on total encounter; more than 50 % of the visit was  spent counseling about plan of care, compliance to diet/exercise and medication regimen and or  coordinating care by the attending physician.      It is advised that s stroke patients follow up with JEFE Milligan @ [phone] in 1- 2 weeks.   Others please follow up with Dr. Michael Nissenbaum [phone]
[facility]        - Patient seen and examined.  - Today, patient is without complaints.         *****MEDICATIONS: Current medication reviewed and documented.    MEDICATIONS  (STANDING):  ALBUTerol/ipratropium for Nebulization 3 milliLiter(s) Nebulizer every 6 hours  amantadine 100 milliGRAM(s) Oral daily  azithromycin   Tablet 250 milliGRAM(s) Oral daily  carbidopa/levodopa  25/100 1 Tablet(s) Oral four times a day  cefTRIAXone   IVPB      cefTRIAXone   IVPB 1 Gram(s) IV Intermittent every 24 hours  enoxaparin Injectable 60 milliGRAM(s) SubCutaneous every 12 hours  methocarbamol 750 milliGRAM(s) Oral three times a day  oxyCODONE    5 mG/acetaminophen 325 mG 1 Tablet(s) Oral every 12 hours  polyethylene glycol 3350 17 Gram(s) Oral daily  rotigotine patch 4 mG/24 Hr(s) 1 Patch Transdermal every 24 hours  thiamine IVPB 500 milliGRAM(s) IV Intermittent daily  trihexyphenidyl 0.5 milliGRAM(s) Oral two times a day    MEDICATIONS  (PRN):           ***** REVIEW OF SYSTEM:  GEN: no fever, no chills, no pain  RESP: no SOB, no cough, no sputum  CVS: no chest pain, no palpitations, no edema  GI: no abdominal pain, no nausea, no vomiting, no constipation, no diarrhea  : no dysurea, no frequency  NEURO: no headache, no diziness  PSYCH: no depression, not anxious  Derm : no itching, no rash         ***** VITAL SIGNS:  T(F): 97.9 (19 @ 07:26), Max: 98.8 (19 @ 22:02)  HR: 62 (19 @ 07:26) (62 - 82)  BP: 117/78 (19 @ 07:26) (112/71 - 130/72)  RR: 18 (19 @ 07:26) (18 - 18)  SpO2: 97% (19 @ 07:26) (96% - 97%)  Wt(kg): --  ,   I&O's Summary    2019 07:01  -  2019 07:00  --------------------------------------------------------  IN: 0 mL / OUT: 300 mL / NET: -300 mL    2019 07:01  -  2019 13:03  --------------------------------------------------------  IN: 200 mL / OUT: 0 mL / NET: 200 mL             *****PHYSICAL EXAM: Alert oriented x 2  Attention comprehension are fair. Able to name, repeat, read without any difficulty.   Able to follow 1-2  step commands.  hypophonic      EOMI fundi not visualized,  VFF to confrontration  No facial asymmetry   Tongue is midline   Palate elevates symmetrically   Moving both upper ext spontaneously   increased tone throughout    not moving b/l lower ext to command does withdraw with pain..   Gait : not assessed.              *****LAB AND IMAGIN.2   6.09  )-----------( 205      ( 2019 10:18 )             44.1               02-    138  |  100  |  10  ----------------------------<  95  3.7   |  26  |  0.76    Ca    9.4      2019 05:58  Mg     2.0     02-    TPro  6.5  /  Alb  4.1  /  TBili  1.2  /  DBili  x   /  AST  11  /  ALT  <5<L>  /  AlkPhos  63  02-22           CARDIAC MARKERS ( 2019 05:58 )  x     / x     / 98 U/L / x     / x                Creatine Kinase, Serum: 98 U/L (19 @ 05:58)        [All pertinent recent Imaging/Reports reviewed]           *****A S S E S S M E N T   A N D   P L A N :           48 year old male w/ past medical hx of Parkinson's disease and dystonia who was brought in by EMS secondary to shoulder pain. Patient initially stated he had b/l shoulder pain that started at 1PM, but then requested  services.  ID # [id number] was used. When a  was used, patient reported that he has been having mostly L shoulder pain for the last 1 month. He stated he fell down in his house side-ways secondary to gait imbalance. He did not his head, or lose consciousness during that fall. He reports 3 falls in the last year, that he attributes to gait imbalance secondary to Parkinson's disease. He requested muscle relaxers at one point. He describes his shoulder pain as dull and stabbing pain that the patient localizes to L anterior shoulder that is constant (8.5/10 in severity). He reports nothing makes the pain better or worse. He reports he tried a patch for his shoulder pain, but that did not help.          Problem/Recommendations 1:Parkinson's disease   currently on sinemet 25/250 four times a day, methocarbamol, amantadine and neupro patch.  She showed me medication list from [facility] which states he was taking only 25/100 of sinemet there.  Will change to 25/100.   unclear how long pt has been on neupro patch may be contributing to the  psychosis.  would like to lower neupro 2mg patch.     cpk normal.    Wife is requesting transfer to [facility] as all his doctors are there.     Problem/Recommendations 2: Dystonia unclear etiology.   as per wife, he had extensive w/u and testing at [facility] recently.   pt was previously taking artane which was stopped abruptly per wife. Would start artane 0.5 bid   please obtain records from [facility].   being considered for dbs by Dr. Dye pt to follow up on discharge.     Thank you for allowing me to participate in the care of this patient. Please do not hesitate to call me if you have any  questions.        ________________  Elham Shah MD  [facility] ([facility]  [phone]      30 minutes spent on total encounter; more than 50 % of the visit was  spent counseling about plan of care, compliance to diet/exercise and medication regimen and or  coordinating care by the attending physician.   At the present time, [facility] does not  provide outpatient followup, best to call the your insurance to find a participating provider.  This was explained to you at the time of the visit. Alternatively, if your insurance allows it, you can follow up with a neurologist  Dr. Kamran Mina([place]) [phone] or Dr. Michael Nissenbaum [phone]
Patient is a 48y old  Male who presents with a chief complaint of PE (22 Feb 2019 09:46)      Any change in ROS: Alert and awake: no bleeding     MEDICATIONS  (STANDING):  ALBUTerol/ipratropium for Nebulization 3 milliLiter(s) Nebulizer every 6 hours  amantadine 100 milliGRAM(s) Oral daily  azithromycin   Tablet 250 milliGRAM(s) Oral daily  carbidopa/levodopa  25/100 1 Tablet(s) Oral <User Schedule>  cefTRIAXone   IVPB      cefTRIAXone   IVPB 1 Gram(s) IV Intermittent every 24 hours  enoxaparin Injectable 60 milliGRAM(s) SubCutaneous every 12 hours  methocarbamol 750 milliGRAM(s) Oral three times a day  oxyCODONE    5 mG/acetaminophen 325 mG 1 Tablet(s) Oral every 12 hours  polyethylene glycol 3350 17 Gram(s) Oral daily  rotigotine patch 2 mG/24 Hr(s) 1 Patch Transdermal every 24 hours  thiamine IVPB 500 milliGRAM(s) IV Intermittent daily  trihexyphenidyl 0.5 milliGRAM(s) Oral two times a day    MEDICATIONS  (PRN):    Vital Signs Last 24 Hrs  T(C): 36.9 (23 Feb 2019 08:26), Max: 36.9 (23 Feb 2019 08:26)  T(F): 98.4 (23 Feb 2019 08:26), Max: 98.4 (23 Feb 2019 08:26)  HR: 58 (23 Feb 2019 08:26) (58 - 74)  BP: 108/64 (23 Feb 2019 08:26) (108/64 - 128/69)  BP(mean): --  RR: 18 (23 Feb 2019 08:26) (16 - 18)  SpO2: 98% (23 Feb 2019 08:26) (98% - 98%)    I&O's Summary    22 Feb 2019 07:01  -  23 Feb 2019 07:00  --------------------------------------------------------  IN: 690 mL / OUT: 400 mL / NET: 290 mL          Physical Exam:   GENERAL: NAD, well-groomed, well-developed  HEENT: CATALINA/   Atraumatic, Normocephalic  ENMT: No tonsillar erythema, exudates, or enlargement; Moist mucous membranes, Good dentition, No lesions  NECK: Supple, No JVD, Normal thyroid  CHEST/LUNG: Clear to auscultaion  CVS: Regular rate and rhythm; No murmurs, rubs, or gallops  GI: : Soft, Nontender, Nondistended; Bowel sounds present  NERVOUS SYSTEM:  Alert & Oriented X3  EXTREMITIES:  Tremors left ext!  LYMPH: No lymphadenopathy noted  SKIN: No rashes or lesions  ENDOCRINOLOGY: No Thyromegaly  PSYCH: Appropriate    Labs:  29  CARDIAC MARKERS ( 22 Feb 2019 05:58 )  x     / x     / 98 U/L / x     / x                                14.2   6.09  )-----------( 205      ( 22 Feb 2019 10:18 )             44.1                         15.2   7.8   )-----------( 230      ( 20 Feb 2019 17:11 )             45.6     02-23    140  |  99  |  10  ----------------------------<  87  4.1   |  28  |  0.83  02-22    138  |  100  |  10  ----------------------------<  95  3.7   |  26  |  0.76  02-20    137  |  98  |  11  ----------------------------<  105<H>  3.8   |  25  |  0.74    Ca    9.5      23 Feb 2019 06:29  Ca    9.4      22 Feb 2019 05:58    TPro  6.5  /  Alb  4.0  /  TBili  1.4<H>  /  DBili  x   /  AST  9<L>  /  ALT  <5<L>  /  AlkPhos  63  02-23  TPro  6.5  /  Alb  4.1  /  TBili  1.2  /  DBili  x   /  AST  11  /  ALT  <5<L>  /  AlkPhos  63  02-22  TPro  7.2  /  Alb  4.3  /  TBili  2.0<H>  /  DBili  x   /  AST  24  /  ALT  16  /  AlkPhos  73  02-20    CAPILLARY BLOOD GLUCOSE          LIVER FUNCTIONS - ( 23 Feb 2019 06:29 )  Alb: 4.0 g/dL / Pro: 6.5 g/dL / ALK PHOS: 63 U/L / ALT: <5 U/L / AST: 9 U/L / GGT: x               D-Dimer Assay, Quantitative: 630 ng/mL DDU (02-20 @ 19:43)  Serum Pro-Brain Natriuretic Peptide: 7 pg/mL (02-20 @ 17:11)        RECENT CULTURES:  02-22 @ 09:43 .Blood Blood         < from: CT Angio Chest w/ IV Cont (02.20.19 @ 21:42) >    Below the diaphragm, visualized portions of the abdomen are unremarkable.     Visualized osseous structures are within normal limits.    Impression: Pulmonary emboli within the segmental and subsegmental   branches of the left lower lobe pulmonary artery.    Small consolidation containing dilated airways with adjacent patchy   groundglass/nodular opacities in the right upper lobe is of uncertain   etiology. Follow-up CT scan is recommended in 3 months to ensure   resolution.    The above finding was communicated to JEFE Summers on 2/21/2019 at 10:33 AM.                    JEAN CARLOS ARGUELLO M.D., ATTENDING RADIOLOGIST  This document has been electronically signed. Feb 21 2019 10:35AM    < end of copied text >         No growth to date.          RESPIRATORY CULTURES:          Studies  Chest X-RAY  CT SCAN Chest   Venous Dopplers: LE:   CT Abdomen  Others
Patient is a 48y old  Male who presents with a chief complaint of PE (22 Feb 2019 09:46)    pt. seen and examined, doing fair, denies any c/o   resting tremors +    INTERVAL HPI/OVERNIGHT EVENTS:  T(C): 36.7 (02-23-19 @ 00:23), Max: 36.8 (02-22-19 @ 18:09)  HR: 74 (02-23-19 @ 00:23) (62 - 74)  BP: 116/72 (02-23-19 @ 00:23) (116/72 - 128/69)  RR: 16 (02-23-19 @ 00:23) (16 - 18)  SpO2: 98% (02-23-19 @ 00:23) (97% - 98%)  Wt(kg): --  I&O's Summary    21 Feb 2019 07:01  -  22 Feb 2019 07:00  --------------------------------------------------------  IN: 0 mL / OUT: 300 mL / NET: -300 mL    22 Feb 2019 07:01  -  23 Feb 2019 02:32  --------------------------------------------------------  IN: 690 mL / OUT: 400 mL / NET: 290 mL        PAST MEDICAL & SURGICAL HISTORY:  Psychiatric diagnosis  Parkinson disease  S/P hernia repair  History of appendectomy      SOCIAL HISTORY  Alcohol:  Tobacco:  Illicit substance use:    FAMILY HISTORY:    REVIEW OF SYSTEMS:  CONSTITUTIONAL: No fever, weight loss, or fatigue  EYES: No eye pain, visual disturbances, or discharge  ENMT:  No difficulty hearing, tinnitus, vertigo; No sinus or throat pain  NECK: No pain or stiffness  RESPIRATORY: No cough, wheezing, chills or hemoptysis; No shortness of breath  CARDIOVASCULAR: No chest pain, palpitations, dizziness, or leg swelling  GASTROINTESTINAL: No abdominal or epigastric pain. No nausea, vomiting, or hematemesis; No diarrhea or constipation. No melena or hematochezia.  GENITOURINARY: No dysuria, frequency, hematuria, or incontinence  NEUROLOGICAL: No headaches, memory loss, loss of strength, numbness, or tremors  SKIN: No itching, burning, rashes, or lesions   LYMPH NODES: No enlarged glands  ENDOCRINE: No heat or cold intolerance; No hair loss  MUSCULOSKELETAL: No joint pain or swelling; No muscle, back, or extremity pain  PSYCHIATRIC: No depression, anxiety, mood swings, or difficulty sleeping  HEME/LYMPH: No easy bruising, or bleeding gums  ALLERY AND IMMUNOLOGIC: No hives or eczema    RADIOLOGY & ADDITIONAL TESTS:    Imaging Personally Reviewed:  [ ] YES  [ ] NO    Consultant(s) Notes Reviewed:  [ ] YES  [ ] NO    PHYSICAL EXAM:  GENERAL: NAD, well-groomed, well-developed  HEAD:  Atraumatic, Normocephalic  EYES: EOMI, PERRLA, conjunctiva and sclera clear  ENMT: No tonsillar erythema, exudates, or enlargement; Moist mucous membranes, Good dentition, No lesions  NECK: Supple, No JVD, Normal thyroid  NERVOUS SYSTEM:  Alert & Oriented X3, Good concentration; Motor Strength 5/5 B/L upper and lower extremities; DTRs 2+ intact and symmetric  CHEST/LUNG: Clear to percussion bilaterally; No rales, rhonchi, wheezing, or rubs  HEART: Regular rate and rhythm; No murmurs, rubs, or gallops  ABDOMEN: Soft, Nontender, Nondistended; Bowel sounds present  EXTREMITIES:  2+ Peripheral Pulses, No clubbing, cyanosis, or edema  LYMPH: No lymphadenopathy noted  SKIN: No rashes or lesions    LABS:                        14.2   6.09  )-----------( 205      ( 22 Feb 2019 10:18 )             44.1     02-22    138  |  100  |  10  ----------------------------<  95  3.7   |  26  |  0.76    Ca    9.4      22 Feb 2019 05:58    TPro  6.5  /  Alb  4.1  /  TBili  1.2  /  DBili  x   /  AST  11  /  ALT  <5<L>  /  AlkPhos  63  02-22        CAPILLARY BLOOD GLUCOSE                MEDICATIONS  (STANDING):  ALBUTerol/ipratropium for Nebulization 3 milliLiter(s) Nebulizer every 6 hours  amantadine 100 milliGRAM(s) Oral daily  azithromycin   Tablet 250 milliGRAM(s) Oral daily  carbidopa/levodopa  25/100 1 Tablet(s) Oral <User Schedule>  cefTRIAXone   IVPB      cefTRIAXone   IVPB 1 Gram(s) IV Intermittent every 24 hours  enoxaparin Injectable 60 milliGRAM(s) SubCutaneous every 12 hours  methocarbamol 750 milliGRAM(s) Oral three times a day  oxyCODONE    5 mG/acetaminophen 325 mG 1 Tablet(s) Oral every 12 hours  polyethylene glycol 3350 17 Gram(s) Oral daily  rotigotine patch 2 mG/24 Hr(s) 1 Patch Transdermal every 24 hours  thiamine IVPB 500 milliGRAM(s) IV Intermittent daily  trihexyphenidyl 0.5 milliGRAM(s) Oral two times a day    MEDICATIONS  (PRN):      Care Discussed with Consultants/Other Providers [ ] YES  [ ] NO
Patient is a 48y old  Male who presents with a chief complaint of PE (23 Feb 2019 10:33)    much more alert and awake, talking ,   INTERVAL HPI/OVERNIGHT EVENTS:  T(C): 36.8 (02-23-19 @ 17:04), Max: 36.9 (02-23-19 @ 08:26)  HR: 71 (02-23-19 @ 17:04) (58 - 74)  BP: 125/74 (02-23-19 @ 17:04) (108/64 - 125/74)  RR: 18 (02-23-19 @ 17:04) (16 - 18)  SpO2: 98% (02-23-19 @ 17:04) (98% - 98%)  Wt(kg): --  I&O's Summary    22 Feb 2019 07:01  -  23 Feb 2019 07:00  --------------------------------------------------------  IN: 690 mL / OUT: 400 mL / NET: 290 mL    23 Feb 2019 07:01  -  23 Feb 2019 18:47  --------------------------------------------------------  IN: 700 mL / OUT: 550 mL / NET: 150 mL        PAST MEDICAL & SURGICAL HISTORY:  Psychiatric diagnosis  Parkinson disease  S/P hernia repair  History of appendectomy      SOCIAL HISTORY  Alcohol:  Tobacco:  Illicit substance use:    FAMILY HISTORY:    REVIEW OF SYSTEMS:  CONSTITUTIONAL: No fever, weight loss, or fatigue  EYES: No eye pain, visual disturbances, or discharge  ENMT:  No difficulty hearing, tinnitus, vertigo; No sinus or throat pain  NECK: No pain or stiffness  RESPIRATORY: No cough, wheezing, chills or hemoptysis; No shortness of breath  CARDIOVASCULAR: No chest pain, palpitations, dizziness, or leg swelling  GASTROINTESTINAL: No abdominal or epigastric pain. No nausea, vomiting, or hematemesis; No diarrhea or constipation. No melena or hematochezia.  GENITOURINARY: No dysuria, frequency, hematuria, or incontinence  NEUROLOGICAL: No headaches, memory loss, loss of strength, numbness, or tremors  SKIN: No itching, burning, rashes, or lesions   LYMPH NODES: No enlarged glands  ENDOCRINE: No heat or cold intolerance; No hair loss  MUSCULOSKELETAL: No joint pain or swelling; No muscle, back, or extremity pain  PSYCHIATRIC: No depression, anxiety, mood swings, or difficulty sleeping  HEME/LYMPH: No easy bruising, or bleeding gums  ALLERY AND IMMUNOLOGIC: No hives or eczema    RADIOLOGY & ADDITIONAL TESTS:    Imaging Personally Reviewed:  [ ] YES  [ ] NO    Consultant(s) Notes Reviewed:  [ ] YES  [ ] NO    PHYSICAL EXAM:  GENERAL: NAD, well-groomed, well-developed  HEAD:  Atraumatic, Normocephalic  EYES: EOMI, PERRLA, conjunctiva and sclera clear  ENMT: No tonsillar erythema, exudates, or enlargement; Moist mucous membranes, Good dentition, No lesions  NECK: Supple, No JVD, Normal thyroid  NERVOUS SYSTEM:  Alert & Oriented X3, Good concentration; Motor Strength 5/5 B/L upper and lower extremities; DTRs 2+ intact and symmetric  CHEST/LUNG: Clear to percussion bilaterally; No rales, rhonchi, wheezing, or rubs  HEART: Regular rate and rhythm; No murmurs, rubs, or gallops  ABDOMEN: Soft, Nontender, Nondistended; Bowel sounds present  EXTREMITIES:  2+ Peripheral Pulses, No clubbing, cyanosis, or edema  LYMPH: No lymphadenopathy noted  SKIN: No rashes or lesions    LABS:                        14.2   5.60  )-----------( 216      ( 23 Feb 2019 07:47 )             42.1     02-23    140  |  99  |  10  ----------------------------<  87  4.1   |  28  |  0.83    Ca    9.5      23 Feb 2019 06:29    TPro  6.5  /  Alb  4.0  /  TBili  1.4<H>  /  DBili  x   /  AST  9<L>  /  ALT  <5<L>  /  AlkPhos  63  02-23    PT/INR - ( 23 Feb 2019 09:28 )   PT: 12.1 sec;   INR: 1.06 ratio             CAPILLARY BLOOD GLUCOSE                MEDICATIONS  (STANDING):  ALBUTerol/ipratropium for Nebulization 3 milliLiter(s) Nebulizer every 6 hours  amantadine 100 milliGRAM(s) Oral daily  azithromycin   Tablet 250 milliGRAM(s) Oral daily  carbidopa/levodopa  25/100 1 Tablet(s) Oral <User Schedule>  cefTRIAXone   IVPB      cefTRIAXone   IVPB 1 Gram(s) IV Intermittent every 24 hours  cyanocobalamin Injectable 1000 MICROGram(s) SubCutaneous daily  enoxaparin Injectable 60 milliGRAM(s) SubCutaneous every 12 hours  methocarbamol 750 milliGRAM(s) Oral three times a day  oxyCODONE    5 mG/acetaminophen 325 mG 1 Tablet(s) Oral every 12 hours  polyethylene glycol 3350 17 Gram(s) Oral daily  rotigotine patch 2 mG/24 Hr(s) 1 Patch Transdermal every 24 hours  trihexyphenidyl 0.5 milliGRAM(s) Oral two times a day    MEDICATIONS  (PRN):      Care Discussed with Consultants/Other Providers [ ] YES  [ ] NO
Patient is a 48y old  Male who presents with a chief complaint of pe (24 Feb 2019 10:48)    pt. seen and examined, NAD     INTERVAL HPI/OVERNIGHT EVENTS:  T(C): 36.5 (02-25-19 @ 21:54), Max: 36.7 (02-25-19 @ 16:53)  HR: 98 (02-25-19 @ 21:54) (70 - 98)  BP: 124/76 (02-25-19 @ 21:54) (124/76 - 131/84)  RR: 18 (02-25-19 @ 21:54) (18 - 18)  SpO2: 99% (02-25-19 @ 21:54) (96% - 99%)  Wt(kg): --  I&O's Summary    24 Feb 2019 07:01  -  25 Feb 2019 07:00  --------------------------------------------------------  IN: 1100 mL / OUT: 0 mL / NET: 1100 mL    25 Feb 2019 07:01  -  25 Feb 2019 22:16  --------------------------------------------------------  IN: 840 mL / OUT: 0 mL / NET: 840 mL        PAST MEDICAL & SURGICAL HISTORY:  Psychiatric diagnosis  Parkinson disease  S/P hernia repair  History of appendectomy      SOCIAL HISTORY  Alcohol:  Tobacco:  Illicit substance use:    FAMILY HISTORY:    REVIEW OF SYSTEMS:  CONSTITUTIONAL: No fever, weight loss, or fatigue  EYES: No eye pain, visual disturbances, or discharge  ENMT:  No difficulty hearing, tinnitus, vertigo; No sinus or throat pain  NECK: No pain or stiffness  RESPIRATORY: No cough, wheezing, chills or hemoptysis; No shortness of breath  CARDIOVASCULAR: No chest pain, palpitations, dizziness, or leg swelling  GASTROINTESTINAL: No abdominal or epigastric pain. No nausea, vomiting, or hematemesis; No diarrhea or constipation. No melena or hematochezia.  GENITOURINARY: No dysuria, frequency, hematuria, or incontinence  NEUROLOGICAL: No headaches, memory loss, loss of strength, numbness, or tremors  SKIN: No itching, burning, rashes, or lesions   LYMPH NODES: No enlarged glands  ENDOCRINE: No heat or cold intolerance; No hair loss  MUSCULOSKELETAL: No joint pain or swelling; No muscle, back, or extremity pain  PSYCHIATRIC: No depression, anxiety, mood swings, or difficulty sleeping  HEME/LYMPH: No easy bruising, or bleeding gums  ALLERY AND IMMUNOLOGIC: No hives or eczema    RADIOLOGY & ADDITIONAL TESTS:    Imaging Personally Reviewed:  [ ] YES  [ ] NO    Consultant(s) Notes Reviewed:  [ ] YES  [ ] NO    PHYSICAL EXAM:  GENERAL: NAD, well-groomed, well-developed  HEAD:  Atraumatic, Normocephalic  EYES: EOMI, PERRLA, conjunctiva and sclera clear  ENMT: No tonsillar erythema, exudates, or enlargement; Moist mucous membranes, Good dentition, No lesions  NECK: Supple, No JVD, Normal thyroid  NERVOUS SYSTEM:  Alert & Oriented X3, Good concentration; Motor Strength 5/5 B/L upper and lower extremities; DTRs 2+ intact and symmetric  CHEST/LUNG: Clear to percussion bilaterally; No rales, rhonchi, wheezing, or rubs  HEART: Regular rate and rhythm; No murmurs, rubs, or gallops  ABDOMEN: Soft, Nontender, Nondistended; Bowel sounds present  EXTREMITIES:  2+ Peripheral Pulses, No clubbing, cyanosis, or edema  LYMPH: No lymphadenopathy noted  SKIN: No rashes or lesions    LABS:                        14.6   6.76  )-----------( 249      ( 25 Feb 2019 10:45 )             43.9     02-25    140  |  100  |  8   ----------------------------<  89  3.8   |  26  |  0.80    Ca    9.9      25 Feb 2019 07:12          CAPILLARY BLOOD GLUCOSE                MEDICATIONS  (STANDING):  ALBUTerol/ipratropium for Nebulization 3 milliLiter(s) Nebulizer every 6 hours  amantadine 100 milliGRAM(s) Oral daily  carbidopa/levodopa  25/100 1 Tablet(s) Oral <User Schedule>  cefTRIAXone   IVPB      cefTRIAXone   IVPB 1 Gram(s) IV Intermittent every 24 hours  methocarbamol 750 milliGRAM(s) Oral three times a day  oxyCODONE    5 mG/acetaminophen 325 mG 1 Tablet(s) Oral every 12 hours  polyethylene glycol 3350 17 Gram(s) Oral daily  rivaroxaban 15 milliGRAM(s) Oral two times a day  rotigotine patch 4 mG/24 Hr(s) 1 Patch Transdermal every 24 hours  trihexyphenidyl 0.5 milliGRAM(s) Oral two times a day    MEDICATIONS  (PRN):      Care Discussed with Consultants/Other Providers [ ] YES  [ ] NO
Patient is a 48y old  Male who presents with a chief complaint of pe (24 Feb 2019 10:48)    pt. seen and examined, doing fair, eating ok, denies any dysphagia     INTERVAL HPI/OVERNIGHT EVENTS:  T(C): 36.6 (02-24-19 @ 15:33), Max: 36.8 (02-24-19 @ 08:19)  HR: 70 (02-24-19 @ 15:33) (69 - 70)  BP: 123/77 (02-24-19 @ 15:33) (113/70 - 123/77)  RR: 18 (02-24-19 @ 15:33) (18 - 18)  SpO2: 97% (02-24-19 @ 15:33) (97% - 100%)  Wt(kg): --  I&O's Summary    23 Feb 2019 07:01  -  24 Feb 2019 07:00  --------------------------------------------------------  IN: 870 mL / OUT: 550 mL / NET: 320 mL    24 Feb 2019 07:01  -  24 Feb 2019 18:43  --------------------------------------------------------  IN: 600 mL / OUT: 0 mL / NET: 600 mL        PAST MEDICAL & SURGICAL HISTORY:  Psychiatric diagnosis  Parkinson disease  S/P hernia repair  History of appendectomy      SOCIAL HISTORY  Alcohol:  Tobacco:  Illicit substance use:    FAMILY HISTORY:    REVIEW OF SYSTEMS:  CONSTITUTIONAL: No fever, weight loss, or fatigue  EYES: No eye pain, visual disturbances, or discharge  ENMT:  No difficulty hearing, tinnitus, vertigo; No sinus or throat pain  NECK: No pain or stiffness  RESPIRATORY: No cough, wheezing, chills or hemoptysis; No shortness of breath  CARDIOVASCULAR: No chest pain, palpitations, dizziness, or leg swelling  GASTROINTESTINAL: No abdominal or epigastric pain. No nausea, vomiting, or hematemesis; No diarrhea or constipation. No melena or hematochezia.  GENITOURINARY: No dysuria, frequency, hematuria, or incontinence  NEUROLOGICAL: No headaches, memory loss, loss of strength, numbness, or tremors  SKIN: No itching, burning, rashes, or lesions   LYMPH NODES: No enlarged glands  ENDOCRINE: No heat or cold intolerance; No hair loss  MUSCULOSKELETAL: No joint pain or swelling; No muscle, back, or extremity pain  PSYCHIATRIC: No depression, anxiety, mood swings, or difficulty sleeping  HEME/LYMPH: No easy bruising, or bleeding gums  ALLERY AND IMMUNOLOGIC: No hives or eczema    RADIOLOGY & ADDITIONAL TESTS:    Imaging Personally Reviewed:  [ ] YES  [ ] NO    Consultant(s) Notes Reviewed:  [ ] YES  [ ] NO    PHYSICAL EXAM:  GENERAL: NAD, well-groomed, well-developed  HEAD:  Atraumatic, Normocephalic  EYES: EOMI, PERRLA, conjunctiva and sclera clear  ENMT: No tonsillar erythema, exudates, or enlargement; Moist mucous membranes, Good dentition, No lesions  NECK: Supple, No JVD, Normal thyroid  NERVOUS SYSTEM:  Alert & Oriented X3, Good concentration; Motor Strength 5/5 B/L upper and lower extremities; DTRs 2+ intact and symmetric  CHEST/LUNG: Clear to percussion bilaterally; No rales, rhonchi, wheezing, or rubs  HEART: Regular rate and rhythm; No murmurs, rubs, or gallops  ABDOMEN: Soft, Nontender, Nondistended; Bowel sounds present  EXTREMITIES:  2+ Peripheral Pulses, No clubbing, cyanosis, or edema  LYMPH: No lymphadenopathy noted  SKIN: No rashes or lesions    LABS:                        14.2   5.60  )-----------( 216      ( 23 Feb 2019 07:47 )             42.1     02-23    140  |  99  |  10  ----------------------------<  87  4.1   |  28  |  0.83    Ca    9.5      23 Feb 2019 06:29    TPro  6.5  /  Alb  4.0  /  TBili  1.4<H>  /  DBili  x   /  AST  9<L>  /  ALT  <5<L>  /  AlkPhos  63  02-23    PT/INR - ( 23 Feb 2019 09:28 )   PT: 12.1 sec;   INR: 1.06 ratio             CAPILLARY BLOOD GLUCOSE                MEDICATIONS  (STANDING):  ALBUTerol/ipratropium for Nebulization 3 milliLiter(s) Nebulizer every 6 hours  amantadine 100 milliGRAM(s) Oral daily  azithromycin   Tablet 250 milliGRAM(s) Oral daily  carbidopa/levodopa  25/100 1 Tablet(s) Oral <User Schedule>  cefTRIAXone   IVPB      cefTRIAXone   IVPB 1 Gram(s) IV Intermittent every 24 hours  cyanocobalamin Injectable 1000 MICROGram(s) SubCutaneous daily  methocarbamol 750 milliGRAM(s) Oral three times a day  oxyCODONE    5 mG/acetaminophen 325 mG 1 Tablet(s) Oral every 12 hours  polyethylene glycol 3350 17 Gram(s) Oral daily  rivaroxaban 15 milliGRAM(s) Oral two times a day  rotigotine patch 2 mG/24 Hr(s) 1 Patch Transdermal every 24 hours  trihexyphenidyl 0.5 milliGRAM(s) Oral two times a day    MEDICATIONS  (PRN):      Care Discussed with Consultants/Other Providers [ ] YES  [ ] NO
Patient is a 48y old  Male who presents with a chief complaint of pneumonia (21 Feb 2019 11:21)    pt. seen and examined, only Chadian speaking , today CT chest shows pul embolism   NAD , sinemet increased to 6 times / day as per he was taking at home     INTERVAL HPI/OVERNIGHT EVENTS:  T(C): 36.9 (02-21-19 @ 15:51), Max: 36.9 (02-21-19 @ 15:51)  HR: 82 (02-21-19 @ 15:51) (77 - 91)  BP: 112/71 (02-21-19 @ 15:51) (107/69 - 135/84)  RR: 18 (02-21-19 @ 15:51) (17 - 18)  SpO2: 96% (02-21-19 @ 15:51) (96% - 98%)  Wt(kg): --  I&O's Summary      PAST MEDICAL & SURGICAL HISTORY:  Psychiatric diagnosis  Parkinson disease  S/P hernia repair  History of appendectomy      SOCIAL HISTORY  Alcohol:  Tobacco:  Illicit substance use:    FAMILY HISTORY:    REVIEW OF SYSTEMS:  CONSTITUTIONAL: No fever, weight loss, or fatigue  EYES: No eye pain, visual disturbances, or discharge  ENMT:  No difficulty hearing, tinnitus, vertigo; No sinus or throat pain  NECK: No pain or stiffness  RESPIRATORY: No cough, wheezing, chills or hemoptysis; No shortness of breath  CARDIOVASCULAR: No chest pain, palpitations, dizziness, or leg swelling  GASTROINTESTINAL: No abdominal or epigastric pain. No nausea, vomiting, or hematemesis; No diarrhea or constipation. No melena or hematochezia.  GENITOURINARY: No dysuria, frequency, hematuria, or incontinence  NEUROLOGICAL: No headaches, memory loss, loss of strength, numbness, or tremors  SKIN: No itching, burning, rashes, or lesions   LYMPH NODES: No enlarged glands  ENDOCRINE: No heat or cold intolerance; No hair loss  MUSCULOSKELETAL: No joint pain or swelling; No muscle, back, or extremity pain  PSYCHIATRIC: No depression, anxiety, mood swings, or difficulty sleeping  HEME/LYMPH: No easy bruising, or bleeding gums  ALLERY AND IMMUNOLOGIC: No hives or eczema    RADIOLOGY & ADDITIONAL TESTS:    Imaging Personally Reviewed:  [ ] YES  [ ] NO    Consultant(s) Notes Reviewed:  [ ] YES  [ ] NO    PHYSICAL EXAM:  GENERAL: NAD, well-groomed, well-developed  HEAD:  Atraumatic, Normocephalic  EYES: EOMI, PERRLA, conjunctiva and sclera clear  ENMT: No tonsillar erythema, exudates, or enlargement; Moist mucous membranes, Good dentition, No lesions  NECK: Supple, No JVD, Normal thyroid  NERVOUS SYSTEM:  Alert & Oriented X3, Good concentration; Motor Strength 5/5 B/L upper and lower extremities; DTRs 2+ intact and symmetric  CHEST/LUNG: Clear to percussion bilaterally; No rales, rhonchi, wheezing, or rubs  HEART: Regular rate and rhythm; No murmurs, rubs, or gallops  ABDOMEN: Soft, Nontender, Nondistended; Bowel sounds present  EXTREMITIES:  2+ Peripheral Pulses, No clubbing, cyanosis, or edema  LYMPH: No lymphadenopathy noted  SKIN: No rashes or lesions    LABS:                        15.2   7.8   )-----------( 230      ( 20 Feb 2019 17:11 )             45.6     02-20    137  |  98  |  11  ----------------------------<  105<H>  3.8   |  25  |  0.74    Ca    9.4      20 Feb 2019 17:11  Mg     2.0     02-20    TPro  7.2  /  Alb  4.3  /  TBili  2.0<H>  /  DBili  x   /  AST  24  /  ALT  16  /  AlkPhos  73  02-20        CAPILLARY BLOOD GLUCOSE                MEDICATIONS  (STANDING):  ALBUTerol/ipratropium for Nebulization 3 milliLiter(s) Nebulizer every 6 hours  amantadine 100 milliGRAM(s) Oral daily  carbidopa/levodopa  25/100 1 Tablet(s) Oral four times a day  cefTRIAXone   IVPB      enoxaparin Injectable 60 milliGRAM(s) SubCutaneous every 12 hours  methocarbamol 750 milliGRAM(s) Oral three times a day  oxyCODONE    5 mG/acetaminophen 325 mG 1 Tablet(s) Oral every 12 hours  polyethylene glycol 3350 17 Gram(s) Oral daily  rotigotine patch 4 mG/24 Hr(s) 1 Patch Transdermal every 24 hours  thiamine IVPB 500 milliGRAM(s) IV Intermittent daily  trihexyphenidyl 0.5 milliGRAM(s) Oral two times a day    MEDICATIONS  (PRN):      Care Discussed with Consultants/Other Providers [ ] YES  [ ] NO
[facility]        - Patient seen and examined.  - Today, patient is without complaints.         *****MEDICATIONS: Current medication reviewed and documented.    MEDICATIONS  (STANDING):  ALBUTerol/ipratropium for Nebulization 3 milliLiter(s) Nebulizer every 6 hours  amantadine 100 milliGRAM(s) Oral daily  carbidopa/levodopa  25/100 1 Tablet(s) Oral <User Schedule>  cefTRIAXone   IVPB      cefTRIAXone   IVPB 1 Gram(s) IV Intermittent every 24 hours  cyanocobalamin Injectable 1000 MICROGram(s) SubCutaneous daily  methocarbamol 750 milliGRAM(s) Oral three times a day  oxyCODONE    5 mG/acetaminophen 325 mG 1 Tablet(s) Oral every 12 hours  polyethylene glycol 3350 17 Gram(s) Oral daily  rivaroxaban 15 milliGRAM(s) Oral two times a day  rotigotine patch 4 mG/24 Hr(s) 1 Patch Transdermal every 24 hours  trihexyphenidyl 0.5 milliGRAM(s) Oral two times a day    MEDICATIONS  (PRN):           ***** REVIEW OF SYSTEM:  GEN: no fever, no chills, no pain  RESP: no SOB, no cough, no sputum  CVS: no chest pain, no palpitations, no edema  GI: no abdominal pain, no nausea, no vomiting, no constipation, no diarrhea  : no dysurea, no frequency  NEURO: no headache, no diziness  PSYCH: no depression, not anxious  Derm : no itching, no rash         ***** VITAL SIGNS:  T(F): 97.3 (19 @ 07:35), Max: 98.2 (19 @ 21:46)  HR: 70 (19 @ 14:21) (70 - 84)  BP: 131/84 (19 @ 14:21) (118/69 - 131/84)  RR: 18 (19 @ 07:35) (18 - 18)  SpO2: 96% (19 @ 14:21) (96% - 97%)  Wt(kg): --  ,   I&O's Summary    2019 07:01  -  2019 07:00  --------------------------------------------------------  IN: 1100 mL / OUT: 0 mL / NET: 1100 mL             *****PHYSICAL EXAM: Alert oriented x 2  Attention comprehension are fair. Able to name, repeat, read without any difficulty.   Able to follow 1-2  step commands.  hypophonic      EOMI fundi not visualized,  VFF to confrontration  No facial asymmetry   Tongue is midline   Palate elevates symmetrically   Moving both upper ext spontaneously   increased tone throughout    not moving b/l lower ext to command does withdraw with pain..   Gait : not assessed.         *****LAB AND IMAGIN.6   6.76  )-----------( 249      ( 2019 10:45 )             43.9                   140  |  100  |  8   ----------------------------<  89  3.8   |  26  |  0.80    Ca    9.9      2019 07:12                           [All pertinent recent Imaging/Reports reviewed]           *****A S S E S S M E N T   A N D   P L A N :      48 year old male w/ past medical hx of Parkinson's disease and dystonia who was brought in by EMS secondary to shoulder pain. Patient initially stated he had b/l shoulder pain that started at 1PM, but then requested  services.  ID # [id number] was used. When a  was used, patient reported that he has been having mostly L shoulder pain for the last 1 month. He stated he fell down in his house side-ways secondary to gait imbalance. He did not his head, or lose consciousness during that fall. He reports 3 falls in the last year, that he attributes to gait imbalance secondary to Parkinson's disease. He requested muscle relaxers at one point. He describes his shoulder pain as dull and stabbing pain that the patient localizes to L anterior shoulder that is constant (8.5/10 in severity). He reports nothing makes the pain better or worse. He reports he tried a patch for his shoulder pain, but that did not help.          Problem/Recommendations 1:Parkinson's disease   currently on sinemet 25/250 four times a day, methocarbamol, amantadine and neupro patch.  She showed me medication list from [facility] which states he was taking only 25/100 of sinemet there.  Will change to 25/100.    continue neupro 4mg ( as pt was very symptomatic with 2mg )    Wife is requesting transfer to [facility] as all his doctors are there.     Problem/Recommendations 2: Dystonia unclear etiology.   as per wife, he had extensive w/u and testing at [facility] recently.   pt was previously taking artane which was stopped abruptly per wife. Would start artane 0.5 bid   please obtain records from [facility].    Dystonia worse, given ativan 1mg x 1   Pt will go to rehab on discharge,  no anticipated procedure or  surgery in the near future.  Pt has to be evaluated by Dr. Dye on discharge from rehab.    Thank you for allowing me to participate in the care of this patient. Please do not hesitate to call me if you have any  questions.        ________________  Elham Shah MD  [facility]  [phone]      30 minutes spent on total encounter; more than 50 % of the visit was  spent counseling about plan of care, compliance to diet/exercise and medication regimen and or  coordinating care by the attending physician.      It is advised that s stroke patients follow up with JEFE Milligan @ [phone] in 1- 2 weeks.   Others please follow up with Dr. Michael Nissenbaum [phone]
Vital Signs Last 24 Hrs  T(C): 36.8 (2019 17:04), Max: 36.9 (2019 08:26)  T(F): 98.3 (2019 17:04), Max: 98.4 (2019 08:26)  HR: 71 (2019 17:04) (58 - 74)  BP: 125/74 (2019 17:04) (108/64 - 128/69)  BP(mean): --  RR: 18 (2019 17:04) (16 - 18)  SpO2: 98% (2019 17:04) (98% - 98%)[facility]        - Patient seen and examined.  - Today, patient is without complaints.         *****MEDICATIONS: Current medication reviewed and documented.       MEDICATIONS  (STANDING):  ALBUTerol/ipratropium for Nebulization 3 milliLiter(s) Nebulizer every 6 hours  amantadine 100 milliGRAM(s) Oral daily  azithromycin   Tablet 250 milliGRAM(s) Oral daily  carbidopa/levodopa  25/100 1 Tablet(s) Oral <User Schedule>  cefTRIAXone   IVPB      cefTRIAXone   IVPB 1 Gram(s) IV Intermittent every 24 hours  enoxaparin Injectable 60 milliGRAM(s) SubCutaneous every 12 hours  methocarbamol 750 milliGRAM(s) Oral three times a day  oxyCODONE    5 mG/acetaminophen 325 mG 1 Tablet(s) Oral every 12 hours  polyethylene glycol 3350 17 Gram(s) Oral daily  rotigotine patch 2 mG/24 Hr(s) 1 Patch Transdermal every 24 hours  trihexyphenidyl 0.5 milliGRAM(s) Oral two times a day    MEDICATIONS  (PRN):         ***** REVIEW OF SYSTEM:  GEN: no fever, no chills, no pain  RESP: no SOB, no cough, no sputum  CVS: no chest pain, no palpitations, no edema  GI: no abdominal pain, no nausea, no vomiting, no constipation, no diarrhea  : no dysurea, no frequency  NEURO: no headache, no diziness  PSYCH: no depression, not anxious  Derm : no itching, no rash                *****PHYSICAL EXAM: Alert oriented x 2  Attention comprehension are fair. Able to name, repeat, read without any difficulty.   Able to follow 1-2  step commands.  hypophonic      EOMI fundi not visualized,  VFF to confrontration  No facial asymmetry   Tongue is midline   Palate elevates symmetrically   Moving both upper ext spontaneously   increased tone throughout    not moving b/l lower ext to command does withdraw with pain..   Gait : not assessed.              *****LAB AND IMAGIN.2   6.09  )-----------( 205      ( 2019 10:18 )             44.1                   138  |  100  |  10  ----------------------------<  95  3.7   |  26  |  0.76    Ca    9.4      2019 05:58  Mg     2.0         TPro  6.5  /  Alb  4.1  /  TBili  1.2  /  DBili  x   /  AST  11  /  ALT  <5<L>  /  AlkPhos  63             CARDIAC MARKERS ( 2019 05:58 )  x     / x     / 98 U/L / x     / x                Creatine Kinase, Serum: 98 U/L (19 @ 05:58)        [All pertinent recent Imaging/Reports reviewed]           *****A S S E S S M E N T   A N D   P L A N :           48 year old male w/ past medical hx of Parkinson's disease and dystonia who was brought in by EMS secondary to shoulder pain. Patient initially stated he had b/l shoulder pain that started at 1PM, but then requested  services.  ID # [id number] was used. When a  was used, patient reported that he has been having mostly L shoulder pain for the last 1 month. He stated he fell down in his house side-ways secondary to gait imbalance. He did not his head, or lose consciousness during that fall. He reports 3 falls in the last year, that he attributes to gait imbalance secondary to Parkinson's disease. He requested muscle relaxers at one point. He describes his shoulder pain as dull and stabbing pain that the patient localizes to L anterior shoulder that is constant (8.5/10 in severity). He reports nothing makes the pain better or worse. He reports he tried a patch for his shoulder pain, but that did not help.          Problem/Recommendations 1:Parkinson's disease   currently on sinemet 25/250 four times a day, methocarbamol, amantadine and neupro patch.  She showed me medication list from [facility] which states he was taking only 25/100 of sinemet there.  Will change to 25/100.   unclear how long pt has been on neupro patch may be contributing to the  psychosis.  would like to lower neupro 2mg patch.     cpk normal.    Wife is requesting transfer to [facility] as all his doctors are there.     Problem/Recommendations 2: Dystonia unclear etiology.   as per wife, he had extensive w/u and testing at [facility] recently.   pt was previously taking artane which was stopped abruptly per wife. Would start artane 0.5 bid   please obtain records from [facility].   being considered for dbs by Dr. Dye pt to follow up on discharge.   b12 borderline would supplement 1000 mcg daily parenteral while inpt.     Thank you for allowing me to participate in the care of this patient. Please do not hesitate to call me if you have any  questions.        ________________  Elham Shah MD  [facility]  [phone]      30 minutes spent on total encounter; more than 50 % of the visit was  spent counseling about plan of care, compliance to diet/exercise and medication regimen and or  coordinating care by the attending physician.   At the present time, [facility] does not  provide outpatient followup, best to call the your insurance to find a participating provider.  This was explained to you at the time of the visit. Alternatively, if your insurance allows it, you can follow up with a neurologist  Dr. Kamran Mina([place]) [phone] or Dr. Michael Nissenbaum [phone]
Patient is a 48y old  Male who presents with a chief complaint of PE (23 Feb 2019 10:33)      Any change in ROS:     MEDICATIONS  (STANDING):  ALBUTerol/ipratropium for Nebulization 3 milliLiter(s) Nebulizer every 6 hours  amantadine 100 milliGRAM(s) Oral daily  azithromycin   Tablet 250 milliGRAM(s) Oral daily  carbidopa/levodopa  25/100 1 Tablet(s) Oral <User Schedule>  cefTRIAXone   IVPB      cefTRIAXone   IVPB 1 Gram(s) IV Intermittent every 24 hours  cyanocobalamin Injectable 1000 MICROGram(s) SubCutaneous daily  methocarbamol 750 milliGRAM(s) Oral three times a day  oxyCODONE    5 mG/acetaminophen 325 mG 1 Tablet(s) Oral every 12 hours  polyethylene glycol 3350 17 Gram(s) Oral daily  rivaroxaban 15 milliGRAM(s) Oral two times a day  rotigotine patch 2 mG/24 Hr(s) 1 Patch Transdermal every 24 hours  trihexyphenidyl 0.5 milliGRAM(s) Oral two times a day    MEDICATIONS  (PRN):    Vital Signs Last 24 Hrs  T(C): 36.8 (24 Feb 2019 08:19), Max: 36.8 (23 Feb 2019 17:04)  T(F): 98.2 (24 Feb 2019 08:19), Max: 98.3 (23 Feb 2019 17:04)  HR: 69 (24 Feb 2019 08:19) (69 - 71)  BP: 113/70 (24 Feb 2019 08:19) (113/70 - 125/74)  BP(mean): --  RR: 18 (24 Feb 2019 08:19) (18 - 18)  SpO2: 97% (24 Feb 2019 08:19) (97% - 100%)    I&O's Summary    23 Feb 2019 07:01  -  24 Feb 2019 07:00  --------------------------------------------------------  IN: 870 mL / OUT: 550 mL / NET: 320 mL          Physical Exam:   GENERAL: NAD, well-groomed, well-developed  HEENT: CATALINA/   Atraumatic, Normocephalic  ENMT: No tonsillar erythema, exudates, or enlargement; Moist mucous membranes, Good dentition, No lesions  NECK: Supple, No JVD, Normal thyroid  CHEST/LUNG: Clear to auscultaion, ; No rales, rhonchi, wheezing, or rubs  CVS: Regular rate and rhythm; No murmurs, rubs, or gallops  GI: : Soft, Nontender, Nondistended; Bowel sounds present  NERVOUS SYSTEM:  Alert & Oriented X3  EXTREMITIES:  left arm tremors:   LYMPH: No lymphadenopathy noted  SKIN: No rashes or lesions  ENDOCRINOLOGY: No Thyromegaly  PSYCH: Appropriate    Labs:  29                            14.2   5.60  )-----------( 216      ( 23 Feb 2019 07:47 )             42.1                         14.2   6.09  )-----------( 205      ( 22 Feb 2019 10:18 )             44.1                         15.2   7.8   )-----------( 230      ( 20 Feb 2019 17:11 )             45.6     02-23    140  |  99  |  10  ----------------------------<  87  4.1   |  28  |  0.83  02-22    138  |  100  |  10  ----------------------------<  95  3.7   |  26  |  0.76  02-20    137  |  98  |  11  ----------------------------<  105<H>  3.8   |  25  |  0.74    Ca    9.5      23 Feb 2019 06:29    TPro  6.5  /  Alb  4.0  /  TBili  1.4<H>  /  DBili  x   /  AST  9<L>  /  ALT  <5<L>  /  AlkPhos  63  02-23  TPro  6.5  /  Alb  4.1  /  TBili  1.2  /  DBili  x   /  AST  11  /  ALT  <5<L>  /  AlkPhos  63  02-22  TPro  7.2  /  Alb  4.3  /  TBili  2.0<H>  /  DBili  x   /  AST  24  /  ALT  16  /  AlkPhos  73  02-20    CAPILLARY BLOOD GLUCOSE          LIVER FUNCTIONS - ( 23 Feb 2019 06:29 )  Alb: 4.0 g/dL / Pro: 6.5 g/dL / ALK PHOS: 63 U/L / ALT: <5 U/L / AST: 9 U/L / GGT: x           PT/INR - ( 23 Feb 2019 09:28 )   PT: 12.1 sec;   INR: 1.06 ratio             D-Dimer Assay, Quantitative: 630 ng/mL DDU (02-20 @ 19:43)        RECENT CULTURES:  02-22 @ 09:43 .Blood Blood         < from: CT Angio Chest w/ IV Cont (02.20.19 @ 21:42) >  INTERPRETATION:  Clinical information: Shortness of breath and elevated   d-dimer levels. Evaluate for pulmonary embolus.    CT angiogram of the chest was obtained following administration of   intravenous contrast. Approximately 90 cc of Omnipaque 350 was   administered and 10 cc was discarded. Coronal, sagittal and MIP images   were submitted for review.    No hilar and/or mediastinal adenopathy is noted.     Heart is normal in size. No pericardial effusion is noted. Pulmonary   arteries are normal in caliber. Filling defects are noted within the   segmental and subsegmental branches of the left lower lobe pulmonary   artery.     No endobronchial lesions are noted. A small consolidation containing   dilated airways is noted in the apical segment of the right upper lobe.   Adjacent patchy groundglass and nodular opacities are also noted.   Calcified granuloma is noted in the lingula. No pleural effusions are   noted.    Below the diaphragm, visualized portions of the abdomen are unremarkable.     Visualized osseous structures are within normal limits.    Impression: Pulmonary emboli within the segmental and subsegmental   branches of the left lower lobe pulmonary artery.    Small consolidation containing dilated airways with adjacent patchy   groundglass/nodular opacities in the right upper lobe is of uncertain   etiology. Follow-up CT scan is recommended in 3 months to ensure   resolution.    The above finding was communicated to JEFE Summers on 2/21/2019 at 10:33 AM.                    JEAN CARLOS ARGUELLO M.D., ATTENDING RADIOLOGIST  This document has been electronically signed. Feb 21 2019 10:35AM        < end of copied text >         No growth to date.          RESPIRATORY CULTURES:          Studies  Chest X-RAY  CT SCAN Chest   Venous Dopplers: LE:   CT Abdomen  Others
Patient is a 48y old  Male who presents with a chief complaint of pneumonia (21 Feb 2019 11:21)      Any change in ROS:   Sitting in chair with no SOB : discussed with pt with google :   MEDICATIONS  (STANDING):  ALBUTerol/ipratropium for Nebulization 3 milliLiter(s) Nebulizer every 6 hours  amantadine 100 milliGRAM(s) Oral daily  azithromycin   Tablet 250 milliGRAM(s) Oral daily  carbidopa/levodopa  25/100 1 Tablet(s) Oral four times a day  cefTRIAXone   IVPB      cefTRIAXone   IVPB 1 Gram(s) IV Intermittent every 24 hours  enoxaparin Injectable 60 milliGRAM(s) SubCutaneous every 12 hours  methocarbamol 750 milliGRAM(s) Oral three times a day  oxyCODONE    5 mG/acetaminophen 325 mG 1 Tablet(s) Oral every 12 hours  polyethylene glycol 3350 17 Gram(s) Oral daily  rotigotine patch 4 mG/24 Hr(s) 1 Patch Transdermal every 24 hours  thiamine IVPB 500 milliGRAM(s) IV Intermittent daily  trihexyphenidyl 0.5 milliGRAM(s) Oral two times a day    MEDICATIONS  (PRN):    Vital Signs Last 24 Hrs  T(C): 36.6 (22 Feb 2019 07:26), Max: 37.1 (21 Feb 2019 22:02)  T(F): 97.9 (22 Feb 2019 07:26), Max: 98.8 (21 Feb 2019 22:02)  HR: 62 (22 Feb 2019 07:26) (62 - 82)  BP: 117/78 (22 Feb 2019 07:26) (112/71 - 130/72)  BP(mean): --  RR: 18 (22 Feb 2019 07:26) (18 - 18)  SpO2: 97% (22 Feb 2019 07:26) (96% - 97%)    I&O's Summary    21 Feb 2019 07:01  -  22 Feb 2019 07:00  --------------------------------------------------------  IN: 0 mL / OUT: 300 mL / NET: -300 mL          Physical Exam:   GENERAL: NAD, well-groomed, well-developed  HEENT: CATALINA/   Atraumatic, Normocephalic  ENMT: No tonsillar erythema, exudates, or enlargement; Moist mucous membranes, Good dentition, No lesions  NECK: Supple, No JVD, Normal thyroid  CHEST/LUNG: Clear to auscultaion, ; No rales, rhonchi, wheezing, or rubs  CVS: Regular rate and rhythm; No murmurs, rubs, or gallops  GI: : Soft, Nontender, Nondistended; Bowel sounds present  NERVOUS SYSTEM:  Alert & Oriented X3  EXTREMITIES:  -edema  LYMPH: No lymphadenopathy noted  SKIN: No rashes or lesions  ENDOCRINOLOGY: No Thyromegaly  PSYCH: Appropriate    Labs:  29  CARDIAC MARKERS ( 22 Feb 2019 05:58 )  x     / x     / 98 U/L / x     / x                                15.2   7.8   )-----------( 230      ( 20 Feb 2019 17:11 )             45.6     02-22    138  |  100  |  10  ----------------------------<  95  3.7   |  26  |  0.76  02-20    137  |  98  |  11  ----------------------------<  105<H>  3.8   |  25  |  0.74    Ca    9.4      22 Feb 2019 05:58  Ca    9.4      20 Feb 2019 17:11  Mg     2.0     02-20    TPro  6.5  /  Alb  4.1  /  TBili  1.2  /  DBili  x   /  AST  11  /  ALT  <5<L>  /  AlkPhos  63  02-22  TPro  7.2  /  Alb  4.3  /  TBili  2.0<H>  /  DBili  x   /  AST  24  /  ALT  16  /  AlkPhos  73  02-20    CAPILLARY BLOOD GLUCOSE          LIVER FUNCTIONS - ( 22 Feb 2019 05:58 )  Alb: 4.1 g/dL / Pro: 6.5 g/dL / ALK PHOS: 63 U/L / ALT: <5 U/L / AST: 11 U/L / GGT: x               D-Dimer Assay, Quantitative: 630 ng/mL DDU (02-20 @ 19:43)  Serum Pro-Brain Natriuretic Peptide: 7 pg/mL (02-20 @ 17:11)        RECENT CULTURES:        RESPIRATORY CULTURES:    < from: CT Angio Chest w/ IV Cont (02.20.19 @ 21:42) >  segmental and subsegmental branches of the left lower lobe pulmonary   artery.     No endobronchial lesions are noted. A small consolidation containing   dilated airways is noted in the apical segment of the right upper lobe.   Adjacent patchy groundglass and nodular opacities are also noted.   Calcified granuloma is noted in the lingula. No pleural effusions are   noted.    Below the diaphragm, visualized portions of the abdomen are unremarkable.     Visualized osseous structures are within normal limits.    Impression: Pulmonary emboli within the segmental and subsegmental   branches of the left lower lobe pulmonary artery.    Small consolidation containing dilated airways with adjacent patchy   groundglass/nodular opacities in the right upper lobe is of uncertain   etiology. Follow-up CT scan is recommended in 3 months to ensure   resolution.    The above finding was communicated to JEFE Summers on 2/21/2019 at 10:33 AM.                    JEAN CARLOS ARGUELLO M.D., ATTENDING RADIOLOGIST  This document has been electronically signed. Feb 21 2019 10:35AM    < end of copied text >        Studies  Chest X-RAY  CT SCAN Chest   Venous Dopplers: LE:   CT Abdomen  Others
Patient is a 48y old  Male who presents with a chief complaint of pe (24 Feb 2019 10:48)      Any change in ROS: doing ok : he can  understand english and responds to me appropriately:   He says his breathing is ok : no CHest pain     MEDICATIONS  (STANDING):  ALBUTerol/ipratropium for Nebulization 3 milliLiter(s) Nebulizer every 6 hours  amantadine 100 milliGRAM(s) Oral daily  carbidopa/levodopa  25/100 1 Tablet(s) Oral <User Schedule>  methocarbamol 750 milliGRAM(s) Oral three times a day  oxyCODONE    5 mG/acetaminophen 325 mG 1 Tablet(s) Oral every 12 hours  polyethylene glycol 3350 17 Gram(s) Oral daily  rivaroxaban 15 milliGRAM(s) Oral two times a day  rotigotine patch 4 mG/24 Hr(s) 1 Patch Transdermal every 24 hours  trihexyphenidyl 0.5 milliGRAM(s) Oral two times a day    MEDICATIONS  (PRN):    Vital Signs Last 24 Hrs  T(C): 36.4 (26 Feb 2019 07:58), Max: 36.7 (25 Feb 2019 16:53)  T(F): 97.5 (26 Feb 2019 07:58), Max: 98 (25 Feb 2019 16:53)  HR: 79 (26 Feb 2019 07:58) (70 - 98)  BP: 116/67 (26 Feb 2019 07:58) (116/67 - 131/84)  BP(mean): --  RR: 18 (26 Feb 2019 07:58) (18 - 18)  SpO2: 99% (26 Feb 2019 07:58) (96% - 99%)    I&O's Summary    25 Feb 2019 07:01  -  26 Feb 2019 07:00  --------------------------------------------------------  IN: 960 mL / OUT: 0 mL / NET: 960 mL          Physical Exam:   GENERAL: NAD, well-groomed, well-developed  HEENT: CATALINA/   Atraumatic, Normocephalic  ENMT: No tonsillar erythema, exudates, or enlargement; Moist mucous membranes, Good dentition, No lesions  NECK: Supple, No JVD, Normal thyroid  CHEST/LUNG: Clear to auscultaion  CVS: Regular rate and rhythm; No murmurs, rubs, or gallops  GI: : Soft, Nontender, Nondistended; Bowel sounds present  NERVOUS SYSTEM:  Alert & Oriented X3  EXTREMITIES:  2+ Peripheral Pulses, No clubbing, cyanosis, or edema  LYMPH: No lymphadenopathy noted  SKIN: No rashes or lesions  ENDOCRINOLOGY: No Thyromegaly  PSYCH: Appropriate    Labs:  29                            14.6   6.76  )-----------( 249      ( 25 Feb 2019 10:45 )             43.9                         14.2   5.60  )-----------( 216      ( 23 Feb 2019 07:47 )             42.1                         14.2   6.09  )-----------( 205      ( 22 Feb 2019 10:18 )             44.1     02-25    140  |  100  |  8   ----------------------------<  89  3.8   |  26  |  0.80  02-23    140  |  99  |  10  ----------------------------<  87  4.1   |  28  |  0.83    Ca    9.9      25 Feb 2019 07:12    TPro  6.5  /  Alb  4.0  /  TBili  1.4<H>  /  DBili  x   /  AST  9<L>  /  ALT  <5<L>  /  AlkPhos  63  02-23    CAPILLARY BLOOD GLUCOSE                D-Dimer Assay, Quantitative: 630 ng/mL DDU (02-20 @ 19:43)        RECENT CULTURES:  02-22 @ 09:43 .Blood Blood         < from: Xray Chest 1 View- PORTABLE-Urgent (02.25.19 @ 11:11) >    EXAM:  XR CHEST PORTABLE URGENT 1V                            PROCEDURE DATE:  02/25/2019            INTERPRETATION:  INDICATION: Parkinson's disease and shortness of breath.      COMPARISON: Radiograph 2/20/2019 at 1904 hours.      FINDINGS:      Lines and Tubes: None.      Lungs: The lungs are clear.      Pleura: No pleural effusion or pneumothorax.      Heart and Mediastinum: The heart is normal in size.           Skeletal: Unremarkable.        IMPRESSION:    1.  The lungs are clear.                    MAGI ARGUELLO M.D., ATTENDING RADIOLOGIST  This document has been electronically signed. Feb 25 2019 11:28AM        < end of copied text >         No growth to date.          RESPIRATORY CULTURES:          Studies  Chest X-RAY  CT SCAN Chest   Venous Dopplers: LE:   CT Abdomen  Others

## 2019-02-26 NOTE — PROGRESS NOTE ADULT - PROBLEM SELECTOR PROBLEM 3
Pneumonia, bacterial
Shoulder pain, right
Pneumonia, bacterial

## 2019-02-26 NOTE — PROGRESS NOTE ADULT - PROBLEM SELECTOR PROBLEM 5
Inability to walk
Pneumonia, bacterial
Inability to walk

## 2019-02-26 NOTE — DISCHARGE NOTE ADULT - ADDITIONAL INSTRUCTIONS
> Follow up outpatient for ECHO in the community  > Follow up with neurology, pulmonary and cardiology within 10-14 days of discharge

## 2019-02-26 NOTE — PROGRESS NOTE ADULT - PROVIDER SPECIALTY LIST ADULT
Internal Medicine
Neurology
Pulmonology

## 2019-02-26 NOTE — DISCHARGE NOTE ADULT - HOSPITAL COURSE
48 year old male w/ past medical hx of Parkinson's disease and dystonia who was brought in by EMS secondary to shoulder pain and inability to ambulate.  Patient admitted for inability to ambulate, PNA. Neurology was consulted for parkinson's dx management and psychosis related to medications- neupro patch may be contributing to the  psychosis.  Psychiatry consulted and recommended to continue seroquel HS and follow up with psychiatry outpatient. CT chest noted with incidentally finding of  PE Patient refused venous dopplers  and Dr Reich states echocardiogram can be done out patient.

## 2019-02-26 NOTE — PROGRESS NOTE ADULT - PROBLEM SELECTOR PROBLEM 2
Tuberculosis
Parkinson disease
Tuberculosis

## 2019-02-26 NOTE — PROGRESS NOTE ADULT - ATTENDING COMMENTS
seems to be stable from pulmonary point of view: dopplers as well as echo is still pendin/24: await dopplers as wellas echo : hemodynamically stable!!
seems to be stable from pulmonary point of view: dopplers as well as echo is still pendin/24: await dopplers as wellas echo : hemodynamically stable!!  : he refused for dopplers: echo pending:
pt. medically stable to be d/c to rehab
pt. spouse main concern is he lives alone at house for few hours / day, she has 6-7 hrs home care , however she works and for few hours : her  has to live alone . social w/u consult regarding d/c planning
seems to be stable from pulmonary point of view: dopplers as well as echo is still pending:

## 2019-02-26 NOTE — DISCHARGE NOTE ADULT - PLAN OF CARE
full resolution Follow up with in [facility] cont Xarelto as ordered > Xarelto 15mg bid was started on 2/24/19- per protocol; Patient received 5 /42 doses then Xarelto 20mg daily.   >Take your anticoagulation (lovenox, coumadin) as directed.  Follow up with your health care provider within one week. Call for appointment.  If you develop shortness of breath or if your shortness of breath worsens call your Health Care Provider or go to the Emergency Department.  > Follow up out patient with pulmonary for ongoing  management resolved s/p antibiotics cont home meds negative for acute fracture or dislocation

## 2019-02-26 NOTE — DISCHARGE NOTE ADULT - MEDICATION SUMMARY - MEDICATIONS TO TAKE
I will START or STAY ON the medications listed below when I get home from the hospital:    oxycodone-acetaminophen 5 mg-325 mg oral tablet  -- 1 tab(s) by mouth every 12 hours  -- Indication: For Pain    rivaroxaban 15 mg oral tablet  -- 1 tab(s) by mouth 2 times a day  -- Indication: For Acute pulmonary embolism without acute cor pulmonale, unspecified pulmonary embolism type    Xarelto 20 mg oral tablet  -- 1 tab(s) by mouth once a day (in the evening)  ( start after completing loading dose )  -- Indication: For Acute pulmonary embolism- start after completing loading dose- 15mg BID for total of 42 doses)    trihexyphenidyl  -- 0.5 milligram(s) by mouth 2 times a day  -- Indication: For Parkinson disease    rotigotine 4 mg/24 hr transdermal film, extended release  -- 1 patch by transdermal patch every 24 hours  -- Indication: For Parkinson disease    carbidopa-levodopa 25 mg-100 mg oral tablet  -- 1 tab(s) by mouth   -- Indication: For Parkinson disease    amantadine 100 mg oral capsule  -- 1 cap(s) by mouth once a day  -- Indication: For Parkinson disease    ipratropium-albuterol 0.5 mg-2.5 mg/3 mLinhalation solution  -- 3 milliliter(s) inhaled every 6 hours  -- Indication: For SOB/wheezing    polyethylene glycol 3350 oral powder for reconstitution  -- 17 gram(s) by mouth once a day  -- Indication: For constipation    methocarbamol 750 mg oral tablet  -- 1 tab(s) by mouth 3 times a day  -- Indication: For Pain

## 2019-02-26 NOTE — DISCHARGE NOTE ADULT - MEDICATION SUMMARY - MEDICATIONS TO STOP TAKING
I will STOP taking the medications listed below when I get home from the hospital:    divalproex sodium  -- 250 milligram(s) by mouth every 6 hours    QUEtiapine  -- 50 milligram(s) by mouth once a day (at bedtime)    entacapone 200 mg oral tablet  -- 1 tab(s) by mouth 4 times a day

## 2019-02-26 NOTE — DISCHARGE NOTE ADULT - SECONDARY DIAGNOSIS.
Acute pulmonary embolism without acute cor pulmonale, unspecified pulmonary embolism type Pneumonia, bacterial Parkinson disease Shoulder pain, right Dementia

## 2019-02-26 NOTE — PROGRESS NOTE ADULT - PROBLEM SELECTOR PLAN 2
per him, he was treated for tuberculosis from March to september last year: ? ID consult
seen by neuro :  -recommend psych consult for visual hallucination as side effect of meds  -seen by psych   -pt. has out pt. neuro at [facility] and also following for some intracranial implant with neuro outside
seen by neuro :  -recommend psych consult for visual hallucination as side effect of meds  -seen by psych   -pt. has out pt. neuro at [facility] and also following for some intracranial implant with neuro outside
seen by neuro :  -recommend psych consult for visual hallucination as side effect of meds  -seen by psych   -pt. has out pt. neuro at [facility] and also following for some intracranial implant with neuro outside
seen by neuro :  -recommend psych consult for visual hallucination as side effect of meds  -seen by psych   -pt. has out pt. neuro at [facility] and also following for some intracranial implant with neuro outside
seen by neuro :  -recommend psych consult for visual hallucination as side effect of meds  -seen by psych   -pt. has out pt. neuro at [facility] and also following for some intracranial implant with neuro outside
per him, he was treated for tuberculosis from March to september last year: ? ID consult

## 2019-02-26 NOTE — DISCHARGE NOTE ADULT - PATIENT PORTAL LINK FT
You can access the [facility] Patient Portal, offered by [facility], by registering with the following website: [email or web address]

## 2019-02-26 NOTE — PROGRESS NOTE ADULT - PROBLEM SELECTOR PROBLEM 6
Shoulder pain, right
Acute pulmonary embolism without acute cor pulmonale, unspecified pulmonary embolism type
Shoulder pain, right

## 2019-02-26 NOTE — PROGRESS NOTE ADULT - PROBLEM SELECTOR PLAN 5
PT/OT
seen by pulmonary   -less likely PNA , as he is afebrile and no elevated wbc   -if c/s neg : d/c all abx from am
PT/OT

## 2019-02-27 LAB
CULTURE RESULTS: SIGNIFICANT CHANGE UP
CULTURE RESULTS: SIGNIFICANT CHANGE UP
SPECIMEN SOURCE: SIGNIFICANT CHANGE UP
SPECIMEN SOURCE: SIGNIFICANT CHANGE UP

## 2019-03-01 NOTE — BEHAVIORAL HEALTH ASSESSMENT NOTE - NSBHCONSULTSUBST_PSY_A_CORE
Patient is ok to schedule Orthovic injections for bilateral knees with Velia Zamudio.  Please link the [phone] referral to the patient's appointments once scheduled.      Thank you   no

## 2021-04-01 ENCOUNTER — OUTPATIENT (OUTPATIENT)
Dept: OUTPATIENT SERVICES | Facility: HOSPITAL | Age: 51
LOS: 1 days | End: 2021-04-01
Payer: MEDICARE

## 2021-04-01 ENCOUNTER — OUTPATIENT (OUTPATIENT)
Dept: OUTPATIENT SERVICES | Facility: HOSPITAL | Age: 51
LOS: 1 days | End: 2021-04-01

## 2021-04-01 DIAGNOSIS — Z98.890 OTHER SPECIFIED POSTPROCEDURAL STATES: Chronic | ICD-10-CM

## 2021-04-01 DIAGNOSIS — Z90.49 ACQUIRED ABSENCE OF OTHER SPECIFIED PARTS OF DIGESTIVE TRACT: Chronic | ICD-10-CM

## 2021-04-26 DIAGNOSIS — Z71.89 OTHER SPECIFIED COUNSELING: ICD-10-CM

## 2021-12-01 PROCEDURE — G9005: CPT

## 2022-07-06 NOTE — ED ADULT TRIAGE NOTE - PAIN RATING/NUMBER SCALE (0-10): ACTIVITY
Spoke with patient. Patient stated its not major buit he wants to see if his immunization record is update before seeing  Friday. I notified him if he would like he can wait til Friday to see her due to him being a new pt to  and us not having record of him previously. Patient agreed understood and agreed.  
5

## 2025-04-30 NOTE — BEHAVIORAL HEALTH ASSESSMENT NOTE - NS ED BHA MED ROS EYES
No complaints General Sunscreen Counseling: I recommended a broad spectrum sunscreen with a SPF of 30 or higher.  I explained that SPF 30 sunscreens block approximately 97 percent of the sun's harmful rays.  Sunscreens should be applied at least 15 minutes prior to expected sun exposure and then every 2 hours after that as long as sun exposure continues. If swimming or exercising sunscreen should be reapplied every 45 minutes to an hour after getting wet or sweating.  One ounce, or the equivalent of a shot glass full of sunscreen, is adequate to protect the skin not covered by a bathing suit. I also recommended a lip balm with a sunscreen as well. Sun protective clothing can be used in lieu of sunscreen but must be worn the entire time you are exposed to the sun's rays. Detail Level: Detailed